# Patient Record
Sex: FEMALE | Race: BLACK OR AFRICAN AMERICAN | NOT HISPANIC OR LATINO | Employment: PART TIME | URBAN - METROPOLITAN AREA
[De-identification: names, ages, dates, MRNs, and addresses within clinical notes are randomized per-mention and may not be internally consistent; named-entity substitution may affect disease eponyms.]

---

## 2017-09-28 ENCOUNTER — HOSPITAL ENCOUNTER (EMERGENCY)
Facility: OTHER | Age: 42
Discharge: HOME | End: 2017-09-28
Attending: INTERNAL MEDICINE
Payer: COMMERCIAL

## 2017-09-28 VITALS
TEMPERATURE: 98 F | OXYGEN SATURATION: 100 % | WEIGHT: 237 LBS | RESPIRATION RATE: 16 BRPM | HEART RATE: 57 BPM | BODY MASS INDEX: 38.09 KG/M2 | SYSTOLIC BLOOD PRESSURE: 121 MMHG | HEIGHT: 66 IN | DIASTOLIC BLOOD PRESSURE: 85 MMHG

## 2017-09-28 DIAGNOSIS — M25.561 ACUTE PAIN OF RIGHT KNEE: Primary | ICD-10-CM

## 2017-09-28 PROCEDURE — 99283 EMERGENCY DEPT VISIT LOW MDM: CPT

## 2017-09-28 RX ORDER — IBUPROFEN 800 MG/1
800 TABLET ORAL EVERY 8 HOURS PRN
Qty: 30 TABLET | Refills: 0 | Status: SHIPPED | OUTPATIENT
Start: 2017-09-28 | End: 2022-02-25

## 2017-09-29 NOTE — ED PROVIDER NOTES
Encounter Date: 2017       History     Chief Complaint   Patient presents with    Knee Pain     Patient states her right knee hurts for a few days.  Denies any trauma or swelling.     42-year-old female presents to the emergency department complaining of right knee pain ×3 days.  She denies any injury to the right knee.  Denies fevers/chills      The history is provided by the patient. No  was used.   Leg Pain    There was no injury mechanism. The pain is present in the right knee. The quality of the pain is described as aching. The pain is at a severity of 4/10. The pain has been fluctuating since onset. Pertinent negatives include no numbness, no inability to bear weight, no loss of motion, no muscle weakness and no loss of sensation. She reports no foreign bodies present. The symptoms are aggravated by activity and bearing weight. She has tried nothing for the symptoms.     Review of patient's allergies indicates:  No Known Allergies  Past Medical History:   Diagnosis Date    Ovarian cyst      Past Surgical History:   Procedure Laterality Date    CARDIAC SURGERY       SECTION, CLASSIC       SECTION, LOW TRANSVERSE      HYSTERECTOMY      OOPHORECTOMY      PARTIAL HYSTERECTOMY       Family History   Problem Relation Age of Onset    Diabetes Mother     Hypertension Mother      Social History   Substance Use Topics    Smoking status: Former Smoker    Smokeless tobacco: Never Used    Alcohol use Yes      Comment: Socially     Review of Systems   Musculoskeletal: Positive for arthralgias.   Neurological: Negative for numbness.   All other systems reviewed and are negative.      Physical Exam     Initial Vitals [177]   BP Pulse Resp Temp SpO2   (!) 148/94 62 20 98 °F (36.7 °C) 100 %      MAP       112         Physical Exam    Constitutional: She appears well-developed and well-nourished.   Obese   HENT:   Head: Normocephalic and atraumatic.   Eyes: EOM are  normal.   Neck: Normal range of motion.   Cardiovascular: Intact distal pulses.   Pulmonary/Chest: No respiratory distress.   Abdominal: Soft.   Musculoskeletal:   Right knee pain upon movement without edema.  No gross deformity.  Neurovascularly intact   Neurological: She is alert. She has normal strength.   Skin: Skin is warm and dry.   Psychiatric: She has a normal mood and affect.         ED Course   Procedures  Labs Reviewed - No data to display          Medical Decision Making:   Initial Assessment:   42-year-old female presents to the emergency department complaining of right knee pain ×3 days.  She denies any injury to the right knee.  Denies fevers/chills  Differential Diagnosis:   Right knee arthritis  Right knee fracture  Right knee sprain  ED Management:  Patient was given instructions for right knee pain and a prescription for ibuprofen.  She was advised to follow-up with her primary care physician within the next 3 days for reevaluation.                   ED Course      Clinical Impression:   The encounter diagnosis was Acute pain of right knee.    Disposition:   Disposition: Discharged  Condition: Stable                        Dawson Angulo MD  09/28/17 [id number]

## 2020-01-30 DIAGNOSIS — Z12.31 ENCOUNTER FOR SCREENING MAMMOGRAM FOR BREAST CANCER: Primary | ICD-10-CM

## 2020-02-06 ENCOUNTER — HOSPITAL ENCOUNTER (OUTPATIENT)
Dept: RADIOLOGY | Facility: HOSPITAL | Age: 45
Discharge: HOME | End: 2020-02-06
Attending: SPECIALIST
Payer: COMMERCIAL

## 2020-02-06 DIAGNOSIS — Z12.31 ENCOUNTER FOR SCREENING MAMMOGRAM FOR BREAST CANCER: ICD-10-CM

## 2020-02-06 PROCEDURE — 77067 SCR MAMMO BI INCL CAD: CPT | Mod: 26,,, | Performed by: RADIOLOGY

## 2020-02-06 PROCEDURE — 77067 SCR MAMMO BI INCL CAD: CPT | Mod: TC

## 2020-02-06 PROCEDURE — 77063 BREAST TOMOSYNTHESIS BI: CPT | Mod: 26,,, | Performed by: RADIOLOGY

## 2020-02-06 PROCEDURE — 77063 MAMMO DIGITAL SCREENING BILAT WITH TOMOSYNTHESIS_CAD: ICD-10-PCS | Mod: 26,,, | Performed by: RADIOLOGY

## 2020-02-06 PROCEDURE — 77067 MAMMO DIGITAL SCREENING BILAT WITH TOMOSYNTHESIS_CAD: ICD-10-PCS | Mod: 26,,, | Performed by: RADIOLOGY

## 2021-04-29 DIAGNOSIS — Z12.31 SCREENING MAMMOGRAM FOR HIGH-RISK PATIENT: Primary | ICD-10-CM

## 2021-05-04 ENCOUNTER — HOSPITAL ENCOUNTER (OUTPATIENT)
Dept: RADIOLOGY | Facility: HOSPITAL | Age: 46
Discharge: HOME | End: 2021-05-04
Attending: SPECIALIST
Payer: COMMERCIAL

## 2021-05-04 DIAGNOSIS — Z12.31 SCREENING MAMMOGRAM FOR HIGH-RISK PATIENT: ICD-10-CM

## 2021-05-04 PROCEDURE — 77063 MAMMO DIGITAL SCREENING BILAT WITH TOMO: ICD-10-PCS | Mod: 26,,, | Performed by: RADIOLOGY

## 2021-05-04 PROCEDURE — 77067 SCR MAMMO BI INCL CAD: CPT | Mod: 26,,, | Performed by: RADIOLOGY

## 2021-05-04 PROCEDURE — 77067 MAMMO DIGITAL SCREENING BILAT WITH TOMO: ICD-10-PCS | Mod: 26,,, | Performed by: RADIOLOGY

## 2021-05-04 PROCEDURE — 77063 BREAST TOMOSYNTHESIS BI: CPT | Mod: 26,,, | Performed by: RADIOLOGY

## 2021-05-04 PROCEDURE — 77067 SCR MAMMO BI INCL CAD: CPT | Mod: TC

## 2022-02-25 ENCOUNTER — OFFICE VISIT (OUTPATIENT)
Dept: FAMILY MEDICINE | Facility: CLINIC | Age: 47
End: 2022-02-25
Payer: COMMERCIAL

## 2022-02-25 ENCOUNTER — LAB VISIT (OUTPATIENT)
Dept: LAB | Facility: HOSPITAL | Age: 47
End: 2022-02-25
Attending: FAMILY MEDICINE
Payer: COMMERCIAL

## 2022-02-25 VITALS
OXYGEN SATURATION: 98 % | WEIGHT: 266.75 LBS | DIASTOLIC BLOOD PRESSURE: 80 MMHG | HEART RATE: 65 BPM | HEIGHT: 66 IN | SYSTOLIC BLOOD PRESSURE: 120 MMHG | BODY MASS INDEX: 42.87 KG/M2

## 2022-02-25 DIAGNOSIS — E66.01 CLASS 3 SEVERE OBESITY DUE TO EXCESS CALORIES WITH SERIOUS COMORBIDITY AND BODY MASS INDEX (BMI) OF 40.0 TO 44.9 IN ADULT: ICD-10-CM

## 2022-02-25 DIAGNOSIS — Z28.21 COVID-19 VACCINATION DECLINED: ICD-10-CM

## 2022-02-25 DIAGNOSIS — Z80.3 FAMILY HISTORY OF BREAST CANCER: ICD-10-CM

## 2022-02-25 DIAGNOSIS — Z83.3 FAMILY HISTORY OF DIABETES MELLITUS: ICD-10-CM

## 2022-02-25 DIAGNOSIS — Z11.3 ROUTINE SCREENING FOR STI (SEXUALLY TRANSMITTED INFECTION): ICD-10-CM

## 2022-02-25 DIAGNOSIS — Z00.01 ENCOUNTER FOR GENERAL ADULT MEDICAL EXAMINATION WITH ABNORMAL FINDINGS: ICD-10-CM

## 2022-02-25 DIAGNOSIS — R92.8 ABNORMAL MAMMOGRAM OF LEFT BREAST: ICD-10-CM

## 2022-02-25 DIAGNOSIS — Z12.31 ENCOUNTER FOR SCREENING MAMMOGRAM FOR MALIGNANT NEOPLASM OF BREAST: ICD-10-CM

## 2022-02-25 DIAGNOSIS — Z00.01 ENCOUNTER FOR GENERAL ADULT MEDICAL EXAMINATION WITH ABNORMAL FINDINGS: Primary | ICD-10-CM

## 2022-02-25 DIAGNOSIS — Z28.21 INFLUENZA VACCINATION DECLINED: ICD-10-CM

## 2022-02-25 PROBLEM — M25.561 ACUTE PAIN OF RIGHT KNEE: Status: RESOLVED | Noted: 2017-09-28 | Resolved: 2022-02-25

## 2022-02-25 PROBLEM — E66.813 CLASS 3 SEVERE OBESITY DUE TO EXCESS CALORIES WITH SERIOUS COMORBIDITY AND BODY MASS INDEX (BMI) OF 40.0 TO 44.9 IN ADULT: Status: ACTIVE | Noted: 2022-02-25

## 2022-02-25 LAB
ALBUMIN SERPL BCP-MCNC: 3.8 G/DL (ref 3.5–5.2)
ALP SERPL-CCNC: 94 U/L (ref 55–135)
ALT SERPL W/O P-5'-P-CCNC: 29 U/L (ref 10–44)
ANION GAP SERPL CALC-SCNC: 9 MMOL/L (ref 8–16)
AST SERPL-CCNC: 28 U/L (ref 10–40)
BASOPHILS # BLD AUTO: 0.05 K/UL (ref 0–0.2)
BASOPHILS NFR BLD: 1.2 % (ref 0–1.9)
BILIRUB SERPL-MCNC: 0.7 MG/DL (ref 0.1–1)
BUN SERPL-MCNC: 13 MG/DL (ref 6–20)
CALCIUM SERPL-MCNC: 9.6 MG/DL (ref 8.7–10.5)
CHLORIDE SERPL-SCNC: 103 MMOL/L (ref 95–110)
CHOLEST SERPL-MCNC: 199 MG/DL (ref 120–199)
CHOLEST/HDLC SERPL: 4.7 {RATIO} (ref 2–5)
CO2 SERPL-SCNC: 27 MMOL/L (ref 23–29)
CREAT SERPL-MCNC: 0.7 MG/DL (ref 0.5–1.4)
DIFFERENTIAL METHOD: ABNORMAL
EOSINOPHIL # BLD AUTO: 0.1 K/UL (ref 0–0.5)
EOSINOPHIL NFR BLD: 3.1 % (ref 0–8)
ERYTHROCYTE [DISTWIDTH] IN BLOOD BY AUTOMATED COUNT: 14 % (ref 11.5–14.5)
EST. GFR  (AFRICAN AMERICAN): >60 ML/MIN/1.73 M^2
EST. GFR  (NON AFRICAN AMERICAN): >60 ML/MIN/1.73 M^2
ESTIMATED AVG GLUCOSE: 117 MG/DL (ref 68–131)
GLUCOSE SERPL-MCNC: 86 MG/DL (ref 70–110)
HBA1C MFR BLD: 5.7 % (ref 4–5.6)
HCT VFR BLD AUTO: 48.9 % (ref 37–48.5)
HDLC SERPL-MCNC: 42 MG/DL (ref 40–75)
HDLC SERPL: 21.1 % (ref 20–50)
HGB BLD-MCNC: 14.7 G/DL (ref 12–16)
IMM GRANULOCYTES # BLD AUTO: 0.01 K/UL (ref 0–0.04)
IMM GRANULOCYTES NFR BLD AUTO: 0.2 % (ref 0–0.5)
LDLC SERPL CALC-MCNC: 138.6 MG/DL (ref 63–159)
LYMPHOCYTES # BLD AUTO: 2.1 K/UL (ref 1–4.8)
LYMPHOCYTES NFR BLD: 48.9 % (ref 18–48)
MCH RBC QN AUTO: 27.1 PG (ref 27–31)
MCHC RBC AUTO-ENTMCNC: 30.1 G/DL (ref 32–36)
MCV RBC AUTO: 90 FL (ref 82–98)
MONOCYTES # BLD AUTO: 0.5 K/UL (ref 0.3–1)
MONOCYTES NFR BLD: 10.6 % (ref 4–15)
NEUTROPHILS # BLD AUTO: 1.5 K/UL (ref 1.8–7.7)
NEUTROPHILS NFR BLD: 36 % (ref 38–73)
NONHDLC SERPL-MCNC: 157 MG/DL
NRBC BLD-RTO: 0 /100 WBC
PLATELET # BLD AUTO: 298 K/UL (ref 150–450)
PMV BLD AUTO: 12.2 FL (ref 9.2–12.9)
POTASSIUM SERPL-SCNC: 4.1 MMOL/L (ref 3.5–5.1)
PROT SERPL-MCNC: 8.3 G/DL (ref 6–8.4)
RBC # BLD AUTO: 5.42 M/UL (ref 4–5.4)
SODIUM SERPL-SCNC: 139 MMOL/L (ref 136–145)
TRIGL SERPL-MCNC: 92 MG/DL (ref 30–150)
TSH SERPL DL<=0.005 MIU/L-ACNC: 1.99 UIU/ML (ref 0.4–4)
WBC # BLD AUTO: 4.23 K/UL (ref 3.9–12.7)

## 2022-02-25 PROCEDURE — 83036 HEMOGLOBIN GLYCOSYLATED A1C: CPT | Performed by: FAMILY MEDICINE

## 2022-02-25 PROCEDURE — 87389 HIV-1 AG W/HIV-1&-2 AB AG IA: CPT | Performed by: FAMILY MEDICINE

## 2022-02-25 PROCEDURE — 85025 COMPLETE CBC W/AUTO DIFF WBC: CPT | Performed by: FAMILY MEDICINE

## 2022-02-25 PROCEDURE — 84443 ASSAY THYROID STIM HORMONE: CPT | Performed by: FAMILY MEDICINE

## 2022-02-25 PROCEDURE — 86704 HEP B CORE ANTIBODY TOTAL: CPT | Performed by: FAMILY MEDICINE

## 2022-02-25 PROCEDURE — 86706 HEP B SURFACE ANTIBODY: CPT | Performed by: FAMILY MEDICINE

## 2022-02-25 PROCEDURE — 99999 PR PBB SHADOW E&M-EST. PATIENT-LVL III: ICD-10-PCS | Mod: PBBFAC,,, | Performed by: FAMILY MEDICINE

## 2022-02-25 PROCEDURE — 99386 PR PREVENTIVE VISIT,NEW,40-64: ICD-10-PCS | Mod: S$GLB,,, | Performed by: FAMILY MEDICINE

## 2022-02-25 PROCEDURE — 99999 PR PBB SHADOW E&M-EST. PATIENT-LVL III: CPT | Mod: PBBFAC,,, | Performed by: FAMILY MEDICINE

## 2022-02-25 PROCEDURE — 86803 HEPATITIS C AB TEST: CPT | Performed by: FAMILY MEDICINE

## 2022-02-25 PROCEDURE — 99386 PREV VISIT NEW AGE 40-64: CPT | Mod: S$GLB,,, | Performed by: FAMILY MEDICINE

## 2022-02-25 PROCEDURE — 87340 HEPATITIS B SURFACE AG IA: CPT | Performed by: FAMILY MEDICINE

## 2022-02-25 PROCEDURE — 99213 OFFICE O/P EST LOW 20 MIN: CPT | Mod: PBBFAC,PO | Performed by: FAMILY MEDICINE

## 2022-02-25 PROCEDURE — 36415 COLL VENOUS BLD VENIPUNCTURE: CPT | Mod: PO | Performed by: FAMILY MEDICINE

## 2022-02-25 PROCEDURE — 86592 SYPHILIS TEST NON-TREP QUAL: CPT | Performed by: FAMILY MEDICINE

## 2022-02-25 PROCEDURE — 80053 COMPREHEN METABOLIC PANEL: CPT | Performed by: FAMILY MEDICINE

## 2022-02-25 PROCEDURE — 80061 LIPID PANEL: CPT | Performed by: FAMILY MEDICINE
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
## 2022-02-26 LAB — RPR SER QL: NORMAL

## 2022-02-28 LAB
HBV CORE AB SERPL QL IA: NEGATIVE
HBV SURFACE AB SER-ACNC: NEGATIVE M[IU]/ML
HBV SURFACE AG SERPL QL IA: NEGATIVE
HCV AB SERPL QL IA: NEGATIVE
HIV 1+2 AB+HIV1 P24 AG SERPL QL IA: NEGATIVE

## 2022-03-11 ENCOUNTER — OFFICE VISIT (OUTPATIENT)
Dept: FAMILY MEDICINE | Facility: CLINIC | Age: 47
End: 2022-03-11
Payer: COMMERCIAL

## 2022-03-11 VITALS
SYSTOLIC BLOOD PRESSURE: 120 MMHG | DIASTOLIC BLOOD PRESSURE: 80 MMHG | HEART RATE: 68 BPM | WEIGHT: 266.75 LBS | OXYGEN SATURATION: 97 % | BODY MASS INDEX: 42.87 KG/M2 | HEIGHT: 66 IN

## 2022-03-11 DIAGNOSIS — R73.03 PREDIABETES: Primary | ICD-10-CM

## 2022-03-11 DIAGNOSIS — Z28.21 INFLUENZA VACCINATION DECLINED: ICD-10-CM

## 2022-03-11 DIAGNOSIS — E66.01 CLASS 3 SEVERE OBESITY DUE TO EXCESS CALORIES WITH SERIOUS COMORBIDITY AND BODY MASS INDEX (BMI) OF 40.0 TO 44.9 IN ADULT: ICD-10-CM

## 2022-03-11 DIAGNOSIS — Z28.21 COVID-19 VACCINATION DECLINED: ICD-10-CM

## 2022-03-11 DIAGNOSIS — Z11.3 ROUTINE SCREENING FOR STI (SEXUALLY TRANSMITTED INFECTION): ICD-10-CM

## 2022-03-11 DIAGNOSIS — R63.8 DIFFICULTY MAINTAINING WEIGHT: ICD-10-CM

## 2022-03-11 DIAGNOSIS — Z83.3 FAMILY HISTORY OF DIABETES MELLITUS: ICD-10-CM

## 2022-03-11 PROCEDURE — 99213 OFFICE O/P EST LOW 20 MIN: CPT | Mod: S$GLB,,, | Performed by: FAMILY MEDICINE

## 2022-03-11 PROCEDURE — 99999 PR PBB SHADOW E&M-EST. PATIENT-LVL III: ICD-10-PCS | Mod: PBBFAC,,, | Performed by: FAMILY MEDICINE

## 2022-03-11 PROCEDURE — 99213 OFFICE O/P EST LOW 20 MIN: CPT | Mod: PBBFAC,PO | Performed by: FAMILY MEDICINE

## 2022-03-11 PROCEDURE — 99213 PR OFFICE/OUTPT VISIT, EST, LEVL III, 20-29 MIN: ICD-10-PCS | Mod: S$GLB,,, | Performed by: FAMILY MEDICINE

## 2022-03-11 PROCEDURE — 99999 PR PBB SHADOW E&M-EST. PATIENT-LVL III: CPT | Mod: PBBFAC,,, | Performed by: FAMILY MEDICINE

## 2022-03-11 NOTE — PROGRESS NOTES
Subjective:       Patient ID: So Winkler is a 46 y.o. female.    Chief Complaint: Follow-up    Patient Active Problem List   Diagnosis    Class 3 severe obesity due to excess calories with serious comorbidity and body mass index (BMI) of 40.0 to 44.9 in adult    Prediabetes    Family history of diabetes mellitus      HPI  45 yo female presents today to review lab results. Has breast imaging to follow up abn MMG scheduled.     Review of Systems   All other systems reviewed and are negative.      Results for orders placed or performed in visit on 02/25/22   CBC Auto Differential   Result Value Ref Range    WBC 4.23 3.90 - 12.70 K/uL    RBC 5.42 (H) 4.00 - 5.40 M/uL    Hemoglobin 14.7 12.0 - 16.0 g/dL    Hematocrit 48.9 (H) 37.0 - 48.5 %    MCV 90 82 - 98 fL    MCH 27.1 27.0 - 31.0 pg    MCHC 30.1 (L) 32.0 - 36.0 g/dL    RDW 14.0 11.5 - 14.5 %    Platelets 298 150 - 450 K/uL    MPV 12.2 9.2 - 12.9 fL    Immature Granulocytes 0.2 0.0 - 0.5 %    Gran # (ANC) 1.5 (L) 1.8 - 7.7 K/uL    Immature Grans (Abs) 0.01 0.00 - 0.04 K/uL    Lymph # 2.1 1.0 - 4.8 K/uL    Mono # 0.5 0.3 - 1.0 K/uL    Eos # 0.1 0.0 - 0.5 K/uL    Baso # 0.05 0.00 - 0.20 K/uL    nRBC 0 0 /100 WBC    Gran % 36.0 (L) 38.0 - 73.0 %    Lymph % 48.9 (H) 18.0 - 48.0 %    Mono % 10.6 4.0 - 15.0 %    Eosinophil % 3.1 0.0 - 8.0 %    Basophil % 1.2 0.0 - 1.9 %    Differential Method Automated    Comprehensive Metabolic Panel   Result Value Ref Range    Sodium 139 136 - 145 mmol/L    Potassium 4.1 3.5 - 5.1 mmol/L    Chloride 103 95 - 110 mmol/L    CO2 27 23 - 29 mmol/L    Glucose 86 70 - 110 mg/dL    BUN 13 6 - 20 mg/dL    Creatinine 0.7 0.5 - 1.4 mg/dL    Calcium 9.6 8.7 - 10.5 mg/dL    Total Protein 8.3 6.0 - 8.4 g/dL    Albumin 3.8 3.5 - 5.2 g/dL    Total Bilirubin 0.7 0.1 - 1.0 mg/dL    Alkaline Phosphatase 94 55 - 135 U/L    AST 28 10 - 40 U/L    ALT 29 10 - 44 U/L    Anion Gap 9 8 - 16 mmol/L    eGFR if African American >60.0 >60 mL/min/1.73 m^2     eGFR if non African American >60.0 >60 mL/min/1.73 m^2   Hemoglobin A1C   Result Value Ref Range    Hemoglobin A1C 5.7 (H) 4.0 - 5.6 %    Estimated Avg Glucose 117 68 - 131 mg/dL   Lipid Panel   Result Value Ref Range    Cholesterol 199 120 - 199 mg/dL    Triglycerides 92 30 - 150 mg/dL    HDL 42 40 - 75 mg/dL    LDL Cholesterol 138.6 63.0 - 159.0 mg/dL    HDL/Cholesterol Ratio 21.1 20.0 - 50.0 %    Total Cholesterol/HDL Ratio 4.7 2.0 - 5.0    Non-HDL Cholesterol 157 mg/dL   TSH   Result Value Ref Range    TSH 1.992 0.400 - 4.000 uIU/mL   HIV 1/2 Ag/Ab (4th Gen)   Result Value Ref Range    HIV 1/2 Ag/Ab Negative Negative   RPR   Result Value Ref Range    RPR Non-reactive Non-reactive   Hepatitis C Antibody   Result Value Ref Range    Hepatitis C Ab Negative Negative   Hepatitis B Surface Ab, Qualitative   Result Value Ref Range    Hep B S Ab Negative    Hepatitis B Surface Antigen   Result Value Ref Range    Hepatitis B Surface Ag Negative Negative   Hepatitis B Core Antibody, Total   Result Value Ref Range    Hep B Core Total Ab Negative        Objective:     Vitals:    03/11/22 0955   BP: 120/80   Pulse: 68        Physical Exam  Vitals and nursing note reviewed.   Constitutional:       General: She is not in acute distress.     Appearance: Normal appearance. She is obese. She is not ill-appearing, toxic-appearing or diaphoretic.   HENT:      Head: Normocephalic and atraumatic.   Eyes:      General: No scleral icterus.     Conjunctiva/sclera: Conjunctivae normal.   Cardiovascular:      Rate and Rhythm: Normal rate.      Heart sounds: Normal heart sounds.   Pulmonary:      Effort: Pulmonary effort is normal. No respiratory distress.      Breath sounds: Normal breath sounds.   Skin:     Coloration: Skin is not pale.   Neurological:      Mental Status: She is alert. Mental status is at baseline.   Psychiatric:         Attention and Perception: Attention and perception normal.         Mood and Affect: Mood and  affect normal.         Speech: Speech normal.         Behavior: Behavior normal.         Cognition and Memory: Cognition and memory normal.         Judgment: Judgment normal.         Assessment:       1. Prediabetes    2. Class 3 severe obesity due to excess calories with serious comorbidity and body mass index (BMI) of 40.0 to 44.9 in adult    3. Difficulty maintaining weight    4. Routine screening for STI (sexually transmitted infection)    5. Family history of diabetes mellitus    6. COVID-19 vaccination declined    7. Influenza vaccination declined        Plan:         Available lab results reviewed.     Prediabetes  Class 3 severe obesity due to excess calories with serious comorbidity and body mass index (BMI) of 40.0 to 44.9 in adult  Difficulty maintaining weight  -     semaglutide (OZEMPIC) 0.25 mg or 0.5 mg(2 mg/1.5 mL) pen injector; Inject 0.25mg weekly x 4 weeks then 0.5mg weekly thereafter  Dispense: 2 pen; Refill: 1  - Lifestyle modifications reviewed.     Routine screening for STI (sexually transmitted infection)  - Normal labs reviewed, continued safe sex practices encouraged    Family history of diabetes mellitus    COVID-19 vaccination declined  Influenza vaccination declined    Patient's questions answered. Plan reviewed with patient at the end of visit. Relevant precautions to chief complaint and reasons to seek medical care or contact the office sooner reviewed with patient.     Follow up in about 8 weeks (around 5/6/2022) for f/u s/p new medication/ titration.

## 2022-03-29 ENCOUNTER — HOSPITAL ENCOUNTER (OUTPATIENT)
Dept: RADIOLOGY | Facility: HOSPITAL | Age: 47
Discharge: HOME | End: 2022-03-29
Attending: FAMILY MEDICINE
Payer: COMMERCIAL

## 2022-03-29 ENCOUNTER — HOSPITAL ENCOUNTER (OUTPATIENT)
Dept: RADIOLOGY | Facility: HOSPITAL | Age: 47
Discharge: HOME | End: 2022-03-29
Attending: SPECIALIST
Payer: COMMERCIAL

## 2022-03-29 DIAGNOSIS — R92.8 ABNORMAL MAMMOGRAM: ICD-10-CM

## 2022-03-29 DIAGNOSIS — R92.8 ABNORMAL MAMMOGRAM OF LEFT BREAST: ICD-10-CM

## 2022-03-29 PROCEDURE — 76642 US BREAST LEFT LIMITED: ICD-10-PCS | Mod: 26,LT,, | Performed by: RADIOLOGY

## 2022-03-29 PROCEDURE — 76642 ULTRASOUND BREAST LIMITED: CPT | Mod: TC,LT

## 2022-03-29 PROCEDURE — 77061 BREAST TOMOSYNTHESIS UNI: CPT | Mod: 26,LT,, | Performed by: RADIOLOGY

## 2022-03-29 PROCEDURE — 77065 MAMMO DIGITAL DIAGNOSTIC LEFT WITH TOMO: ICD-10-PCS | Mod: 26,LT,, | Performed by: RADIOLOGY

## 2022-03-29 PROCEDURE — 76642 ULTRASOUND BREAST LIMITED: CPT | Mod: 26,LT,, | Performed by: RADIOLOGY

## 2022-03-29 PROCEDURE — 77065 DX MAMMO INCL CAD UNI: CPT | Mod: 26,LT,, | Performed by: RADIOLOGY

## 2022-03-29 PROCEDURE — 77061 MAMMO DIGITAL DIAGNOSTIC LEFT WITH TOMO: ICD-10-PCS | Mod: 26,LT,, | Performed by: RADIOLOGY

## 2022-03-29 PROCEDURE — 77065 DX MAMMO INCL CAD UNI: CPT | Mod: TC,LT

## 2022-03-30 ENCOUNTER — TELEPHONE (OUTPATIENT)
Dept: RADIOLOGY | Facility: HOSPITAL | Age: 47
End: 2022-03-30
Payer: COMMERCIAL

## 2022-03-30 NOTE — TELEPHONE ENCOUNTER
Spoke with patient. Reviewed breast biopsy procedure and reviewed instructions for breast biopsy. Patient expressed understanding and all questions were answered. Provided patient with my phone number to call for any further concerns or questions.   Patient scheduled breast biopsy at the [facility] on 4/22/2022.

## 2022-04-08 ENCOUNTER — TELEPHONE (OUTPATIENT)
Dept: RADIOLOGY | Facility: HOSPITAL | Age: 47
End: 2022-04-08
Payer: COMMERCIAL

## 2022-04-28 ENCOUNTER — TELEPHONE (OUTPATIENT)
Dept: RADIOLOGY | Facility: HOSPITAL | Age: 47
End: 2022-04-28
Payer: COMMERCIAL

## 2022-04-28 NOTE — TELEPHONE ENCOUNTER
Called patient to remind about appointment and ask her to arrive 30 minutes early. Patient has been approved for use of the [facility].

## 2022-04-29 ENCOUNTER — HOSPITAL ENCOUNTER (OUTPATIENT)
Dept: RADIOLOGY | Facility: HOSPITAL | Age: 47
Discharge: HOME | End: 2022-04-29
Attending: FAMILY MEDICINE
Payer: COMMERCIAL

## 2022-04-29 DIAGNOSIS — R92.8 ABNORMAL MAMMOGRAM: ICD-10-CM

## 2022-04-29 PROCEDURE — 77061 BREAST TOMOSYNTHESIS UNI: CPT | Mod: 26,LT,, | Performed by: RADIOLOGY

## 2022-04-29 PROCEDURE — 88305 TISSUE EXAM BY PATHOLOGIST: CPT | Performed by: PATHOLOGY

## 2022-04-29 PROCEDURE — 77065 DX MAMMO INCL CAD UNI: CPT | Mod: TC,LT

## 2022-04-29 PROCEDURE — 88305 TISSUE EXAM BY PATHOLOGIST: CPT | Mod: 26,,, | Performed by: PATHOLOGY

## 2022-04-29 PROCEDURE — 77065 MAMMO DIGITAL DIAGNOSTIC LEFT WITH TOMO: ICD-10-PCS | Mod: 26,LT,, | Performed by: RADIOLOGY

## 2022-04-29 PROCEDURE — 88342 CHG IMMUNOCYTOCHEMISTRY: ICD-10-PCS | Mod: 26,,, | Performed by: PATHOLOGY

## 2022-04-29 PROCEDURE — 77065 DX MAMMO INCL CAD UNI: CPT | Mod: 26,LT,, | Performed by: RADIOLOGY

## 2022-04-29 PROCEDURE — 88305 TISSUE EXAM BY PATHOLOGIST: ICD-10-PCS | Mod: 26,,, | Performed by: PATHOLOGY

## 2022-04-29 PROCEDURE — 88341 IMHCHEM/IMCYTCHM EA ADD ANTB: CPT | Mod: 26,,, | Performed by: PATHOLOGY

## 2022-04-29 PROCEDURE — 88342 IMHCHEM/IMCYTCHM 1ST ANTB: CPT | Mod: 26,,, | Performed by: PATHOLOGY

## 2022-04-29 PROCEDURE — 88342 IMHCHEM/IMCYTCHM 1ST ANTB: CPT | Performed by: PATHOLOGY

## 2022-04-29 PROCEDURE — 77061 MAMMO DIGITAL DIAGNOSTIC LEFT WITH TOMO: ICD-10-PCS | Mod: 26,LT,, | Performed by: RADIOLOGY

## 2022-04-29 PROCEDURE — 27200939 MAMMO BREAST STEREOTACTIC BREAST BIOPSY LEFT

## 2022-04-29 PROCEDURE — 25000003 PHARM REV CODE 250: Performed by: FAMILY MEDICINE

## 2022-04-29 PROCEDURE — 88341 PR IHC OR ICC EACH ADD'L SINGLE ANTIBODY  STAINPR: ICD-10-PCS | Mod: 26,,, | Performed by: PATHOLOGY

## 2022-04-29 PROCEDURE — 19081 MAMMO BREAST STEREOTACTIC BREAST BIOPSY LEFT: ICD-10-PCS | Mod: LT,,, | Performed by: RADIOLOGY

## 2022-04-29 PROCEDURE — 88341 IMHCHEM/IMCYTCHM EA ADD ANTB: CPT | Performed by: PATHOLOGY

## 2022-04-29 PROCEDURE — 19081 BX BREAST 1ST LESION STRTCTC: CPT | Mod: LT,,, | Performed by: RADIOLOGY

## 2022-04-29 RX ORDER — LIDOCAINE HCL/EPINEPHRINE/PF 2%-1:200K
20 VIAL (ML) INJECTION ONCE
Status: COMPLETED | OUTPATIENT
Start: 2022-04-29 | End: 2022-04-29

## 2022-04-29 RX ORDER — SODIUM BICARBONATE 42 MG/ML
3 INJECTION, SOLUTION INTRAVENOUS ONCE
Status: COMPLETED | OUTPATIENT
Start: 2022-04-29 | End: 2022-04-29

## 2022-04-29 RX ORDER — LIDOCAINE HYDROCHLORIDE 10 MG/ML
3 INJECTION INFILTRATION; PERINEURAL ONCE
Status: COMPLETED | OUTPATIENT
Start: 2022-04-29 | End: 2022-04-29

## 2022-04-29 RX ADMIN — LIDOCAINE HYDROCHLORIDE 3 ML: 10 INJECTION, SOLUTION EPIDURAL; INFILTRATION; INTRACAUDAL; PERINEURAL at 11:04

## 2022-04-29 RX ADMIN — LIDOCAINE HYDROCHLORIDE,EPINEPHRINE BITARTRATE 20 ML: 20; .005 INJECTION, SOLUTION EPIDURAL; INFILTRATION; INTRACAUDAL; PERINEURAL at 11:04

## 2022-04-29 RX ADMIN — SODIUM BICARBONATE 3 ML: 42 INJECTION, SOLUTION INTRAVENOUS at 11:04

## 2022-05-05 LAB
FINAL PATHOLOGIC DIAGNOSIS: NORMAL
GROSS: NORMAL
Lab: NORMAL

## 2022-05-06 ENCOUNTER — PATIENT MESSAGE (OUTPATIENT)
Dept: SURGERY | Facility: CLINIC | Age: 47
End: 2022-05-06
Payer: COMMERCIAL

## 2022-05-06 ENCOUNTER — TELEPHONE (OUTPATIENT)
Dept: SURGERY | Facility: CLINIC | Age: 47
End: 2022-05-06
Payer: COMMERCIAL

## 2022-05-06 ENCOUNTER — OFFICE VISIT (OUTPATIENT)
Dept: FAMILY MEDICINE | Facility: CLINIC | Age: 47
End: 2022-05-06
Payer: COMMERCIAL

## 2022-05-06 DIAGNOSIS — Z98.890 S/P BREAST BIOPSY: ICD-10-CM

## 2022-05-06 DIAGNOSIS — R73.03 PREDIABETES: Primary | ICD-10-CM

## 2022-05-06 DIAGNOSIS — E66.01 CLASS 3 SEVERE OBESITY DUE TO EXCESS CALORIES WITH SERIOUS COMORBIDITY AND BODY MASS INDEX (BMI) OF 40.0 TO 44.9 IN ADULT: ICD-10-CM

## 2022-05-06 DIAGNOSIS — R63.8 DIFFICULTY MAINTAINING WEIGHT: ICD-10-CM

## 2022-05-06 PROCEDURE — 1159F MED LIST DOCD IN RCRD: CPT | Mod: CPTII,95,, | Performed by: FAMILY MEDICINE

## 2022-05-06 PROCEDURE — 99213 PR OFFICE/OUTPT VISIT, EST, LEVL III, 20-29 MIN: ICD-10-PCS | Mod: 95,,, | Performed by: FAMILY MEDICINE

## 2022-05-06 PROCEDURE — 3044F HG A1C LEVEL LT 7.0%: CPT | Mod: CPTII,95,, | Performed by: FAMILY MEDICINE

## 2022-05-06 PROCEDURE — 99213 OFFICE O/P EST LOW 20 MIN: CPT | Mod: 95,,, | Performed by: FAMILY MEDICINE

## 2022-05-06 PROCEDURE — 3044F PR MOST RECENT HEMOGLOBIN A1C LEVEL <7.0%: ICD-10-PCS | Mod: CPTII,95,, | Performed by: FAMILY MEDICINE

## 2022-05-06 PROCEDURE — 1160F PR REVIEW ALL MEDS BY PRESCRIBER/CLIN PHARMACIST DOCUMENTED: ICD-10-PCS | Mod: CPTII,95,, | Performed by: FAMILY MEDICINE

## 2022-05-06 PROCEDURE — 1159F PR MEDICATION LIST DOCUMENTED IN MEDICAL RECORD: ICD-10-PCS | Mod: CPTII,95,, | Performed by: FAMILY MEDICINE

## 2022-05-06 PROCEDURE — 1160F RVW MEDS BY RX/DR IN RCRD: CPT | Mod: CPTII,95,, | Performed by: FAMILY MEDICINE

## 2022-05-06 NOTE — TELEPHONE ENCOUNTER
Returned call as requested by patient.  No answer could not leave a message as voicemail box not setup.  Will try patient again.

## 2022-05-06 NOTE — PROGRESS NOTES
Subjective:       Patient ID: So Winkler is a 46 y.o. female.    Chief Complaint: No chief complaint on file.    Patient Active Problem List   Diagnosis    Class 3 severe obesity due to excess calories with serious comorbidity and body mass index (BMI) of 40.0 to 44.9 in adult    Prediabetes    Family history of diabetes mellitus      HPI  45 yo female presents today for f/u after prescription of Ozempic for weight loss. Reports was not able to . Asked to send to [facility]. Has not been over indulgent with food, but has not started on increased physical activity. Recently became a homeowner and busy with that. S/p breast biopsy from last week.      Review of Systems   Constitutional: Negative for activity change and unexpected weight change.   HENT: Negative for hearing loss, rhinorrhea and trouble swallowing.    Eyes: Negative for discharge and visual disturbance.   Respiratory: Negative for chest tightness and wheezing.    Cardiovascular: Negative for chest pain and palpitations.   Gastrointestinal: Negative for blood in stool, constipation, diarrhea and vomiting.   Endocrine: Negative for polydipsia and polyuria.   Genitourinary: Negative for difficulty urinating, dysuria, hematuria and menstrual problem.   Musculoskeletal: Negative for arthralgias, joint swelling and neck pain.   Neurological: Negative for weakness and headaches.   Psychiatric/Behavioral: Negative for confusion and dysphoric mood.          Results for orders placed or performed during the hospital encounter of 04/29/22   Specimen to Pathology, Radiology Breast, needle biopsy   Result Value Ref Range    Final Pathologic Diagnosis       LEFT BREAST, 12 O'CLOCK DISTORTION, BIOPSY:  Benign breast tissue with features consistent with complex sclerosing lesion  or radial scar.  Negative for atypia or malignancy.  Comment:  The biopsy reveals fibroproliferative changes with aggregates of  sclerotic angulated glands and foci of  "usual ductal hyperplasia (UDH)  radiating towards a vague central region.  P63 immunostains shows intact  myoepithelial cells surrounding sclerotic elements.  CK5/6 reveals patchy  staining within UDH.  The immunostaining supports the interpretation.  Dr. Elayne Mena also reviewed this case and agrees with the diagnosis.      Gross       Container Label and Clinic/AP Number:  [id number]  Received in formalin labeled "left breast 12:00 distortion" is a 3.6 x 1.9 x  0.4 cm aggregate of soft, ragged, tan-yellow, focally tan-white to pink-white  fibrofatty tissue fragments.  Entirely submitted in [id number] and  [id number].  Time collected:  11:05 04/29/2022  Time in formalin:  11:10 04/29/2022  Cold ischemic time:  5 minutes  Gross by: KIANNA Alvarado ([facility])      Disclaimer       Unless the case is a 'gross only' or additional testing only, the final  diagnosis for each specimen is based on a microscopic examination of  appropriate tissue sections.         Objective:   There were no vitals filed for this visit.     Physical Exam  Constitutional:       General: She is not in acute distress.     Appearance: She is well-developed. She is not diaphoretic.      Comments: Exam performed as able, but limited due to the inherent nature of telemedicine visit.    HENT:      Head: Normocephalic and atraumatic.   Eyes:      Conjunctiva/sclera: Conjunctivae normal.   Pulmonary:      Effort: No respiratory distress.      Comments: No audible wheezing noted   Breathing as noted over telemedicine appears normal with no distress  Skin:     Comments: No visible rash noted   Neurological:      Mental Status: She is alert and oriented to person, place, and time.   Psychiatric:         Behavior: Behavior normal.         Thought Content: Thought content normal.         Judgment: Judgment normal.         Assessment:       1. Prediabetes    2. Class 3 severe obesity due to excess calories with serious comorbidity and body mass " index (BMI) of 40.0 to 44.9 in adult    3. Difficulty maintaining weight    4. S/P breast biopsy        Plan:         Prediabetes  Class 3 severe obesity due to excess calories with serious comorbidity and body mass index (BMI) of 40.0 to 44.9 in adult  Difficulty maintaining weight  -     semaglutide (OZEMPIC) 0.25 mg or 0.5 mg(2 mg/1.5 mL) pen injector; Inject 0.25mg weekly x 4 weeks then 0.5mg weekly thereafter  Dispense: 2 pen; Refill: 1    S/P breast biopsy  - pathology reviewed. Pending rec on interval screening but likely yearly.    Patient's questions answered. Plan reviewed with patient at the end of visit. Relevant precautions to chief complaint and reasons to seek medical care or contact the office sooner reviewed with patient.     Follow up if symptoms worsen or fail to improve, for as scheduled.    Future Appointments   Date Time Provider Department Center   2/24/2023  8:00 AM Sissy Zazueta MD [facility]     The patient location is: [place], [place]  The chief complaint leading to consultation is: f/u med    Visit type: audiovisual    Face to Face time with patient: 15  15 minutes of total time spent on the encounter, which includes face to face time and non-face to face time preparing to see the patient (eg, review of tests), Obtaining and/or reviewing separately obtained history, Documenting clinical information in the electronic or other health record, Independently interpreting results (not separately reported) and communicating results to the patient/family/caregiver, or Care coordination (not separately reported).     Each patient to whom he or she provides medical services by telemedicine is:  (1) informed of the relationship between the physician and patient and the respective role of any other health care provider with respect to management of the patient; and (2) notified that he or she may decline to receive medical services by telemedicine and may withdraw from such care at any time.

## 2022-05-06 NOTE — TELEPHONE ENCOUNTER
Contacted patient regarding breast biopsy results.  Patient stated that she was at the [facility] and asked if she could receive a call back about 3 pm.  Will contact patient back around 3 pm as requested.

## 2022-05-09 ENCOUNTER — TELEPHONE (OUTPATIENT)
Dept: SURGERY | Facility: CLINIC | Age: 47
End: 2022-05-09
Payer: COMMERCIAL

## 2022-05-09 ENCOUNTER — PATIENT MESSAGE (OUTPATIENT)
Dept: FAMILY MEDICINE | Facility: CLINIC | Age: 47
End: 2022-05-09
Payer: COMMERCIAL

## 2022-05-09 DIAGNOSIS — E66.01 CLASS 3 SEVERE OBESITY DUE TO EXCESS CALORIES WITH SERIOUS COMORBIDITY AND BODY MASS INDEX (BMI) OF 40.0 TO 44.9 IN ADULT: Primary | ICD-10-CM

## 2022-05-09 DIAGNOSIS — R73.03 PREDIABETES: ICD-10-CM

## 2022-05-09 NOTE — TELEPHONE ENCOUNTER
Called patient with biopsy results from 4/29/2022. Biopsy results showed Radial Scar. Discussed what this means and the results were reviewed by Dr. Wiseman. These results are benign, concordant and a surgical consult is recommended. Patient was scheduled on 5/19/2022 with Dr. Elias. Reviewed Breast center location. Patient verbalized understanding.       Cristal Wiseman MD   5/6/2022  9:30 AM CDT Back to Top        Benign and concordant.  Surgical consultation is recommended for radial scar.

## 2022-05-16 ENCOUNTER — NUTRITION (OUTPATIENT)
Dept: NUTRITION | Facility: CLINIC | Age: 47
End: 2022-05-16
Payer: COMMERCIAL

## 2022-05-16 VITALS — WEIGHT: 266.75 LBS | BODY MASS INDEX: 42.87 KG/M2 | HEIGHT: 66 IN

## 2022-05-16 DIAGNOSIS — Z71.3 DIETARY COUNSELING: ICD-10-CM

## 2022-05-16 DIAGNOSIS — E66.01 CLASS 3 SEVERE OBESITY DUE TO EXCESS CALORIES WITH SERIOUS COMORBIDITY AND BODY MASS INDEX (BMI) OF 40.0 TO 44.9 IN ADULT: Primary | ICD-10-CM

## 2022-05-16 DIAGNOSIS — R73.03 PREDIABETES: ICD-10-CM

## 2022-05-16 PROCEDURE — 97802 PR MED NUTR THER, 1ST, INDIV, EA 15 MIN: ICD-10-PCS | Mod: S$GLB,,, | Performed by: DIETITIAN, REGISTERED

## 2022-05-16 PROCEDURE — 99999 PR PBB SHADOW E&M-EST. PATIENT-LVL III: CPT | Mod: PBBFAC,,,

## 2022-05-16 PROCEDURE — 97802 MEDICAL NUTRITION INDIV IN: CPT | Mod: S$GLB,,, | Performed by: DIETITIAN, REGISTERED

## 2022-05-16 PROCEDURE — 99999 PR PBB SHADOW E&M-EST. PATIENT-LVL III: ICD-10-PCS | Mod: PBBFAC,,,

## 2022-05-16 NOTE — PROGRESS NOTES
"Referring Physician:Sissy Zazueta MD     Reason for visit:  Chief Complaint   Patient presents with    Obesity    prediabetes    Nutrition Counseling      Initial Visit    :1975     Allergies Reviewed  Meds Reviewed    Anthropometrics  Weight:121 kg (266 lb 12.1 oz)  Height:5' 6" (1.676 m)  BMI:Body mass index is 43.06 kg/m².   IBW:   59.0 kg  +/-10%    Meds:  Outpatient Medications Prior to Visit   Medication Sig Dispense Refill    semaglutide (OZEMPIC) 0.25 mg or 0.5 mg(2 mg/1.5 mL) pen injector Inject 0.25mg weekly x 4 weeks then 0.5mg weekly thereafter 2 pen 1     No facility-administered medications prior to visit.       Food/Drug Interactions Noted:  n/a    Vitamins/Supplements/Herbs:  none    Labs:   HgbA1c  5.7  (pt states she is not prediabetic; does have strong family history of diabetes)     Nutrition Prescription: 1770 Kcals/day( 30 kcal/kg IBW),  47-59 g protein( 0.8-1.0 g/kg IBW)     Support System:  Pt does the grocery shopping and cooking for herself and her 20 yo son    Diet Hx:   Pt usually eats only one meal a day.  Works from 4 am - 1 pm at [employer], and brings food if she does eat.  Drinks OJ in the morning, and regular soft drinks occasionally.   Seldom snacks between meals.    Breakfast: may eat grits or a full breakfast at work.  OJ  Lunch:   If she eats breakfast, will skip lunch.    Dinner:   Last night ate out:  [employer] cheeseburger dressed with fries and a jade.    Current activity level and/or physical limitations:  No exercise; plans to get back to walking    Motivation to make changes/anticipated barriers and/or expected adherence:   Pt states she plans to wait until after  to implement any changes to current diet regimen.    Nutrition-Focus Physical Findings:   Pt wearing face covering per COVID19 protocol; pt appears well nourished      Assessment:   Pt states she is going to wait to implement any changes to her diet regimen until after her birthday; is having a " party to celebrate birthday and purchase of her first home.  Plans to enjoy herself and eat/drink whatever she wants.  She asked about detox prior to starting diet regimen changes.  She verbalized understanding of information.    Nutrition Diagnosis:  Obesity RT excess energy intake and physical inactivity AEB BMI > 40    Recommendations:  1500 calorie, low fat, high fiber diet. Avoid fried foods/sugary beverages. Exercise goal:  30 minutes per day, 3-5 days per week.  Handouts provided and reviewed:  Cardiac-TLC Nutrition Therapy; 1500 Calorie Sample 5-Day Menus; Weight Loss Tips; Cooking Tips for Weight Management; Get Fit Shopping List; Eat Fit Plan...Anytime/Anywhere;  Servings of Carbohydrates for Meal Planning; Walking Works; My Plate Planner;  Heart Healthy Eating:  Shopping Tips and Label Reading Tips; Tips to Support Weight Loss; [facility] Snack List for Diabetes      Strategies Implemented:  Portion control; increase physical activity    Consultation Time:20 minutes.  Communicated with referring healthcare provider:  Consult note available in pt's Epic chart per MD discretion  Follow Up:  Pt provided with dietitian contact number and advised to call with questions or make future appointment if further intervention needed.

## 2022-05-16 NOTE — PATIENT INSTRUCTIONS
1500 calorie, low fat, high fiber diet.  Avoid fried foods/sugary beverages.  Exercise goal:  30 minutes per day, 3-5 days per week.
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
## 2022-05-19 ENCOUNTER — OFFICE VISIT (OUTPATIENT)
Dept: SURGERY | Facility: CLINIC | Age: 47
End: 2022-05-19
Payer: COMMERCIAL

## 2022-05-19 VITALS
SYSTOLIC BLOOD PRESSURE: 139 MMHG | BODY MASS INDEX: 42.75 KG/M2 | HEART RATE: 65 BPM | HEIGHT: 66 IN | DIASTOLIC BLOOD PRESSURE: 84 MMHG | WEIGHT: 266 LBS

## 2022-05-19 DIAGNOSIS — N64.89 RADIAL SCAR OF BREAST: Primary | ICD-10-CM

## 2022-05-19 DIAGNOSIS — Z01.818 PRE-OP TESTING: ICD-10-CM

## 2022-05-19 PROCEDURE — 99999 PR PBB SHADOW E&M-EST. PATIENT-LVL III: ICD-10-PCS | Mod: PBBFAC,,, | Performed by: SURGERY

## 2022-05-19 PROCEDURE — 3075F SYST BP GE 130 - 139MM HG: CPT | Mod: CPTII,S$GLB,, | Performed by: SURGERY

## 2022-05-19 PROCEDURE — 3044F PR MOST RECENT HEMOGLOBIN A1C LEVEL <7.0%: ICD-10-PCS | Mod: CPTII,S$GLB,, | Performed by: SURGERY

## 2022-05-19 PROCEDURE — 99204 OFFICE O/P NEW MOD 45 MIN: CPT | Mod: S$GLB,,, | Performed by: SURGERY

## 2022-05-19 PROCEDURE — 3008F BODY MASS INDEX DOCD: CPT | Mod: CPTII,S$GLB,, | Performed by: SURGERY

## 2022-05-19 PROCEDURE — 3079F DIAST BP 80-89 MM HG: CPT | Mod: CPTII,S$GLB,, | Performed by: SURGERY

## 2022-05-19 PROCEDURE — 3044F HG A1C LEVEL LT 7.0%: CPT | Mod: CPTII,S$GLB,, | Performed by: SURGERY

## 2022-05-19 PROCEDURE — 1159F MED LIST DOCD IN RCRD: CPT | Mod: CPTII,S$GLB,, | Performed by: SURGERY

## 2022-05-19 PROCEDURE — 1159F PR MEDICATION LIST DOCUMENTED IN MEDICAL RECORD: ICD-10-PCS | Mod: CPTII,S$GLB,, | Performed by: SURGERY

## 2022-05-19 PROCEDURE — 1160F RVW MEDS BY RX/DR IN RCRD: CPT | Mod: CPTII,S$GLB,, | Performed by: SURGERY

## 2022-05-19 PROCEDURE — 3075F PR MOST RECENT SYSTOLIC BLOOD PRESS GE 130-139MM HG: ICD-10-PCS | Mod: CPTII,S$GLB,, | Performed by: SURGERY

## 2022-05-19 PROCEDURE — 3008F PR BODY MASS INDEX (BMI) DOCUMENTED: ICD-10-PCS | Mod: CPTII,S$GLB,, | Performed by: SURGERY

## 2022-05-19 PROCEDURE — 99999 PR PBB SHADOW E&M-EST. PATIENT-LVL III: CPT | Mod: PBBFAC,,, | Performed by: SURGERY

## 2022-05-19 PROCEDURE — 1160F PR REVIEW ALL MEDS BY PRESCRIBER/CLIN PHARMACIST DOCUMENTED: ICD-10-PCS | Mod: CPTII,S$GLB,, | Performed by: SURGERY

## 2022-05-19 PROCEDURE — 3079F PR MOST RECENT DIASTOLIC BLOOD PRESSURE 80-89 MM HG: ICD-10-PCS | Mod: CPTII,S$GLB,, | Performed by: SURGERY

## 2022-05-19 PROCEDURE — 99204 PR OFFICE/OUTPT VISIT, NEW, LEVL IV, 45-59 MIN: ICD-10-PCS | Mod: S$GLB,,, | Performed by: SURGERY

## 2022-05-19 NOTE — LETTER
May 20, 2022        Sissy Zazueta MD  [address] [facility]  [place] [address]             [facility]  [address]  [place] [address]  Phone: [phone]  Fax: [phone]   Patient: So Winkler   MR Number: [id number]   YOB: 1975   Date of Visit: 5/19/2022       Dear Dr. Zazueta:    Thank you for referring So Winkler to me for evaluation. Below are the relevant portions of my assessment and plan of care.            If you have questions, please do not hesitate to call me. I look forward to following So along with you.    Sincerely,      Dorcas Elias MD           CC  No Recipients

## 2022-05-20 PROBLEM — N64.89 RADIAL SCAR OF BREAST: Status: ACTIVE | Noted: 2022-05-20

## 2022-05-31 ENCOUNTER — HOSPITAL ENCOUNTER (OUTPATIENT)
Dept: RADIOLOGY | Facility: HOSPITAL | Age: 47
Discharge: HOME | End: 2022-05-31
Attending: SURGERY
Payer: COMMERCIAL

## 2022-05-31 DIAGNOSIS — N64.59 ABNORMAL BREAST TISSUE: ICD-10-CM

## 2022-05-31 PROCEDURE — 19281 PERQ DEVICE BREAST 1ST IMAG: CPT | Mod: LT,,, | Performed by: RADIOLOGY

## 2022-05-31 PROCEDURE — 19281 PR PERQ PLCMNT DEV, 1ST LES W/MAMMOGRAPHIC GUIDE: ICD-10-PCS | Mod: LT,,, | Performed by: RADIOLOGY

## 2022-05-31 PROCEDURE — A4648 IMPLANTABLE TISSUE MARKER: HCPCS

## 2022-05-31 PROCEDURE — 25000003 PHARM REV CODE 250: Performed by: SURGERY

## 2022-05-31 RX ORDER — SODIUM BICARBONATE 42 MG/ML
2 INJECTION, SOLUTION INTRAVENOUS ONCE
Status: COMPLETED | OUTPATIENT
Start: 2022-05-31 | End: 2022-05-31

## 2022-05-31 RX ORDER — LIDOCAINE HYDROCHLORIDE 20 MG/ML
8 INJECTION, SOLUTION INFILTRATION; PERINEURAL ONCE
Status: COMPLETED | OUTPATIENT
Start: 2022-05-31 | End: 2022-05-31

## 2022-05-31 RX ADMIN — LIDOCAINE HYDROCHLORIDE 8 ML: 20 INJECTION, SOLUTION INFILTRATION; PERINEURAL at 03:05

## 2022-05-31 RX ADMIN — SODIUM BICARBONATE 2 ML: 42 INJECTION, SOLUTION INTRAVENOUS at 03:05

## 2022-06-02 ENCOUNTER — HOSPITAL ENCOUNTER (OUTPATIENT)
Dept: PREADMISSION TESTING | Facility: OTHER | Age: 47
Discharge: HOME | End: 2022-06-02
Attending: SURGERY
Payer: COMMERCIAL

## 2022-06-02 ENCOUNTER — ANESTHESIA EVENT (OUTPATIENT)
Dept: SURGERY | Facility: OTHER | Age: 47
End: 2022-06-02
Payer: COMMERCIAL

## 2022-06-02 ENCOUNTER — TELEPHONE (OUTPATIENT)
Dept: SURGERY | Facility: CLINIC | Age: 47
End: 2022-06-02
Payer: COMMERCIAL

## 2022-06-02 VITALS
RESPIRATION RATE: 16 BRPM | WEIGHT: 266 LBS | HEART RATE: 66 BPM | HEIGHT: 66 IN | DIASTOLIC BLOOD PRESSURE: 65 MMHG | BODY MASS INDEX: 42.75 KG/M2 | OXYGEN SATURATION: 98 % | TEMPERATURE: 98 F | SYSTOLIC BLOOD PRESSURE: 122 MMHG

## 2022-06-02 DIAGNOSIS — Z01.818 PRE-OP TESTING: ICD-10-CM

## 2022-06-02 LAB
ALBUMIN SERPL BCP-MCNC: 3.6 G/DL (ref 3.5–5.2)
ALP SERPL-CCNC: 96 U/L (ref 55–135)
ALT SERPL W/O P-5'-P-CCNC: 42 U/L (ref 10–44)
ANION GAP SERPL CALC-SCNC: 12 MMOL/L (ref 8–16)
AST SERPL-CCNC: 31 U/L (ref 10–40)
BASOPHILS # BLD AUTO: 0.04 K/UL (ref 0–0.2)
BASOPHILS NFR BLD: 0.7 % (ref 0–1.9)
BILIRUB SERPL-MCNC: 0.5 MG/DL (ref 0.1–1)
BUN SERPL-MCNC: 13 MG/DL (ref 6–20)
CALCIUM SERPL-MCNC: 9.6 MG/DL (ref 8.7–10.5)
CHLORIDE SERPL-SCNC: 106 MMOL/L (ref 95–110)
CO2 SERPL-SCNC: 22 MMOL/L (ref 23–29)
CREAT SERPL-MCNC: 0.7 MG/DL (ref 0.5–1.4)
DIFFERENTIAL METHOD: ABNORMAL
EOSINOPHIL # BLD AUTO: 0.1 K/UL (ref 0–0.5)
EOSINOPHIL NFR BLD: 2.1 % (ref 0–8)
ERYTHROCYTE [DISTWIDTH] IN BLOOD BY AUTOMATED COUNT: 14.2 % (ref 11.5–14.5)
EST. GFR  (AFRICAN AMERICAN): >60 ML/MIN/1.73 M^2
EST. GFR  (NON AFRICAN AMERICAN): >60 ML/MIN/1.73 M^2
GLUCOSE SERPL-MCNC: 85 MG/DL (ref 70–110)
HCT VFR BLD AUTO: 42.4 % (ref 37–48.5)
HGB BLD-MCNC: 13.4 G/DL (ref 12–16)
IMM GRANULOCYTES # BLD AUTO: 0.03 K/UL (ref 0–0.04)
IMM GRANULOCYTES NFR BLD AUTO: 0.5 % (ref 0–0.5)
LYMPHOCYTES # BLD AUTO: 2.5 K/UL (ref 1–4.8)
LYMPHOCYTES NFR BLD: 41.9 % (ref 18–48)
MCH RBC QN AUTO: 27.6 PG (ref 27–31)
MCHC RBC AUTO-ENTMCNC: 31.6 G/DL (ref 32–36)
MCV RBC AUTO: 87 FL (ref 82–98)
MONOCYTES # BLD AUTO: 0.7 K/UL (ref 0.3–1)
MONOCYTES NFR BLD: 11.4 % (ref 4–15)
NEUTROPHILS # BLD AUTO: 2.6 K/UL (ref 1.8–7.7)
NEUTROPHILS NFR BLD: 43.4 % (ref 38–73)
NRBC BLD-RTO: 0 /100 WBC
PLATELET # BLD AUTO: 237 K/UL (ref 150–450)
PMV BLD AUTO: 11.3 FL (ref 9.2–12.9)
POTASSIUM SERPL-SCNC: 4.8 MMOL/L (ref 3.5–5.1)
PROT SERPL-MCNC: 7.4 G/DL (ref 6–8.4)
RBC # BLD AUTO: 4.86 M/UL (ref 4–5.4)
SODIUM SERPL-SCNC: 140 MMOL/L (ref 136–145)
WBC # BLD AUTO: 6.06 K/UL (ref 3.9–12.7)

## 2022-06-02 PROCEDURE — 36415 COLL VENOUS BLD VENIPUNCTURE: CPT | Performed by: SURGERY

## 2022-06-02 PROCEDURE — 80053 COMPREHEN METABOLIC PANEL: CPT | Performed by: SURGERY

## 2022-06-02 PROCEDURE — 85025 COMPLETE CBC W/AUTO DIFF WBC: CPT | Performed by: SURGERY

## 2022-06-02 RX ORDER — FAMOTIDINE 20 MG/1
20 TABLET, FILM COATED ORAL
Status: CANCELLED | OUTPATIENT
Start: 2022-06-02 | End: 2022-06-02

## 2022-06-02 RX ORDER — LIDOCAINE HYDROCHLORIDE 10 MG/ML
0.5 INJECTION, SOLUTION EPIDURAL; INFILTRATION; INTRACAUDAL; PERINEURAL ONCE
Status: CANCELLED | OUTPATIENT
Start: 2022-06-02 | End: 2022-06-02

## 2022-06-02 RX ORDER — ACETAMINOPHEN 500 MG
1000 TABLET ORAL
Status: CANCELLED | OUTPATIENT
Start: 2022-06-02 | End: 2022-06-02

## 2022-06-02 RX ORDER — SODIUM CHLORIDE, SODIUM LACTATE, POTASSIUM CHLORIDE, CALCIUM CHLORIDE 600; 310; 30; 20 MG/100ML; MG/100ML; MG/100ML; MG/100ML
INJECTION, SOLUTION INTRAVENOUS CONTINUOUS
Status: CANCELLED | OUTPATIENT
Start: 2022-06-02

## 2022-06-02 NOTE — DISCHARGE INSTRUCTIONS
Information to Prepare you for your Surgery    PRE-ADMIT TESTING -  [phone]    [address] [facility]          Your surgery has been scheduled at [facility]. We are pleased to have the opportunity to serve you. For Further Information please call [phone].    On the day of surgery please report to the Information Desk on the 1st floor.    CONTACT YOUR PHYSICIAN'S OFFICE THE DAY PRIOR TO YOUR SURGERY TO OBTAIN YOUR ARRIVAL TIME.     The evening before surgery do not eat anything after 9 p.m. ( this includes hard candy, chewing gum and mints).  You may only have GATORADE, POWERADE AND WATER  from 9 p.m. until you leave your home.   DO NOT DRINK ANY LIQUIDS ON THE WAY TO THE HOSPITAL.      Why does your anesthesiologist allow you to drink Gatorade/Powerade before surgery?  Gatorade/Powerade helps to increase your comfort before surgery and to decrease your nausea after surgery. The carbohydrates in Gatorade/Powerade help reduce your body's stress response to surgery.  If you are a diabetic-drink only water prior to surgery.      Current Visitor policy(12/27/2021) - Patients may have 2 visitors pre and post procedure. Only 2 visitors will be allowed in the Surgical building with the patient.     SPECIAL MEDICATION INSTRUCTIONS: TAKE medications checked off by the Anesthesiologist on your Medication List.    Angiogram Patients: Take medications as instructed by your physician, including aspirin.     Surgery Patients:    If you take ASPIRIN - Your PHYSICIAN/SURGEON will need to inform you IF/OR when you need to stop taking aspirin prior to your surgery.     Do Not take any medications containing IBUPROFEN.    Do Not Wear any make-up (especially eye make-up) to surgery. Please remove any false eyelashes or eyelash extensions. If you arrive the day of surgery with makeup/eyelashes on you will be required to remove prior to surgery. (There is a risk of corneal  abrasions if eye makeup/eyelash extensions are not removed)      Leave all valuables at home.   Do Not wear any jewelry or watches, including any metal in body piercings. Jewelry must be removed prior to coming to the hospital.  There is a possibility that rings that are unable to be removed may be cut off if they are on the surgical extremity.    Please remove all hair extensions, wigs, clips and any other metal accessories/ ornaments from your hair.  These items may pose a flammable/fire risk in Surgery and must be removed.    Do not shave your surgical area at least 5 days prior to your surgery. The surgical prep will be performed at the hospital according to Infection Control regulations.    Contact Lens must be removed before surgery. Either do not wear the contact lens or bring a case and solution for storage.  Please bring a container for eyeglasses or dentures as required.  Bring any paperwork your physician has provided, such as consent forms,  history and physicals, doctor's orders, etc.   Bring comfortable clothes that are loose fitting to wear upon discharge. Take into consideration the type of surgery being performed.  Maintain your diet as advised per your physician the day prior to surgery.      Adequate rest the night before surgery is advised.   Park in the Parking lot behind the hospital or in the [employer] [facility] across the street from the parking lot. Parking is complimentary.  If you will be discharged the same day as your procedure, please arrange for a responsible adult to drive you home or to accompany you if traveling by taxi.   YOU WILL NOT BE PERMITTED TO DRIVE OR TO LEAVE THE HOSPITAL ALONE AFTER SURGERY.   If you are being discharged the same day, it is strongly recommended that you arrange for someone to remain with you for the first 24 hrs following your surgery.    The Surgeon will speak to your family/visitor after your surgery regarding the outcome of your surgery and post op  care.  The Surgeon may speak to you after your surgery, but there is a possibility you may not remember the details.  Please check with your family members regarding the conversation with the Surgeon.    We strongly recommend whoever is bringing you home be present for discharge instructions.  This will ensure a thorough understanding for your post op home care.    ALL CHILDREN MUST ALWAYS BE ACCOMPANIED BY AN ADULT.    Visitors-Refer to current Visitor policy handouts.    Thank you for your cooperation.  The Staff of [facility].            Bathing Instructions with Hibiclens    Shower the evening before and morning of your procedure with Hibiclens:  Wash your face with water and your regular face wash/soap  Apply Hibiclens directly on your skin or on a wet washcloth and wash gently. When showering: Move away from the shower stream when applying Hibiclens to avoid rinsing off too soon.  Rinse thoroughly with warm water  Do not dilute Hibiclens        Dry off as usual, do not use any deodorant, powder, body lotions, perfume, after shave or cologne.

## 2022-06-02 NOTE — TELEPHONE ENCOUNTER
Confirmed arrival time for surgery at the [facility]  on 6/03/22. Arrival time is for 12:30 surgery is for 2:30 pm. Nothing eat after 9 pm this evening 6/02/22 . Patient may have clear liquids up until 4 hour's before surgery. Informed patient that she may have 2 people with the morning of surgery . Leave all jewelry home also please wear a button down shirt the morning of surgery.  voiced understanding to this call.

## 2022-06-03 ENCOUNTER — ANESTHESIA (OUTPATIENT)
Dept: SURGERY | Facility: OTHER | Age: 47
End: 2022-06-03
Payer: COMMERCIAL

## 2022-06-03 ENCOUNTER — HOSPITAL ENCOUNTER (OUTPATIENT)
Dept: RADIOLOGY | Facility: OTHER | Age: 47
Discharge: HOME | End: 2022-06-03
Attending: SURGERY | Admitting: SURGERY
Payer: COMMERCIAL

## 2022-06-03 ENCOUNTER — HOSPITAL ENCOUNTER (OUTPATIENT)
Facility: OTHER | Age: 47
Discharge: HOME | End: 2022-06-03
Attending: SURGERY | Admitting: SURGERY
Payer: COMMERCIAL

## 2022-06-03 DIAGNOSIS — Z01.818 PREOP TESTING: ICD-10-CM

## 2022-06-03 DIAGNOSIS — N64.89 RADIAL SCAR OF BREAST: Primary | ICD-10-CM

## 2022-06-03 DIAGNOSIS — N64.59 ABNORMAL BREAST TISSUE: ICD-10-CM

## 2022-06-03 LAB
CTP QC/QA: YES
SARS-COV-2 AG RESP QL IA.RAPID: NEGATIVE

## 2022-06-03 PROCEDURE — 63600175 PHARM REV CODE 636 W HCPCS: Performed by: STUDENT IN AN ORGANIZED HEALTH CARE EDUCATION/TRAINING PROGRAM

## 2022-06-03 PROCEDURE — 71000016 HC POSTOP RECOV ADDL HR: Performed by: SURGERY

## 2022-06-03 PROCEDURE — 71000015 HC POSTOP RECOV 1ST HR: Performed by: SURGERY

## 2022-06-03 PROCEDURE — 25000003 PHARM REV CODE 250: Performed by: ANESTHESIOLOGY

## 2022-06-03 PROCEDURE — 76098 X-RAY EXAM SURGICAL SPECIMEN: CPT | Mod: 26,,, | Performed by: RADIOLOGY

## 2022-06-03 PROCEDURE — 76098 X-RAY EXAM SURGICAL SPECIMEN: CPT | Mod: TC

## 2022-06-03 PROCEDURE — 19125 EXCISION BREAST LESION: CPT | Mod: LT,,, | Performed by: SURGERY

## 2022-06-03 PROCEDURE — 36000706: Performed by: SURGERY

## 2022-06-03 PROCEDURE — 63600175 PHARM REV CODE 636 W HCPCS: Performed by: NURSE ANESTHETIST, CERTIFIED REGISTERED

## 2022-06-03 PROCEDURE — 63600175 PHARM REV CODE 636 W HCPCS: Performed by: ANESTHESIOLOGY

## 2022-06-03 PROCEDURE — 71000033 HC RECOVERY, INTIAL HOUR: Performed by: SURGERY

## 2022-06-03 PROCEDURE — 37000009 HC ANESTHESIA EA ADD 15 MINS: Performed by: SURGERY

## 2022-06-03 PROCEDURE — 25000003 PHARM REV CODE 250: Performed by: NURSE ANESTHETIST, CERTIFIED REGISTERED

## 2022-06-03 PROCEDURE — 88307 TISSUE EXAM BY PATHOLOGIST: CPT | Mod: 26,,, | Performed by: PATHOLOGY

## 2022-06-03 PROCEDURE — 88307 PR  SURG PATH,LEVEL V: ICD-10-PCS | Mod: 26,,, | Performed by: PATHOLOGY

## 2022-06-03 PROCEDURE — 25000003 PHARM REV CODE 250: Performed by: SURGERY

## 2022-06-03 PROCEDURE — C1894 INTRO/SHEATH, NON-LASER: HCPCS | Performed by: SURGERY

## 2022-06-03 PROCEDURE — 36000707: Performed by: SURGERY

## 2022-06-03 PROCEDURE — 19125 PR EXCISE BREAST LES W XRAY MARKER: ICD-10-PCS | Mod: LT,,, | Performed by: SURGERY

## 2022-06-03 PROCEDURE — 88307 TISSUE EXAM BY PATHOLOGIST: CPT | Performed by: PATHOLOGY

## 2022-06-03 PROCEDURE — 76098 MAMMO BREAST SPECIMEN: ICD-10-PCS | Mod: 26,,, | Performed by: RADIOLOGY

## 2022-06-03 PROCEDURE — 37000008 HC ANESTHESIA 1ST 15 MINUTES: Performed by: SURGERY

## 2022-06-03 RX ORDER — LIDOCAINE HYDROCHLORIDE 10 MG/ML
0.5 INJECTION, SOLUTION EPIDURAL; INFILTRATION; INTRACAUDAL; PERINEURAL ONCE
Status: DISCONTINUED | OUTPATIENT
Start: 2022-06-03 | End: 2022-06-03 | Stop reason: HOSPADM

## 2022-06-03 RX ORDER — FENTANYL CITRATE 50 UG/ML
INJECTION, SOLUTION INTRAMUSCULAR; INTRAVENOUS
Status: DISCONTINUED | OUTPATIENT
Start: 2022-06-03 | End: 2022-06-03

## 2022-06-03 RX ORDER — FAMOTIDINE 20 MG/1
20 TABLET, FILM COATED ORAL
Status: COMPLETED | OUTPATIENT
Start: 2022-06-03 | End: 2022-06-03

## 2022-06-03 RX ORDER — PROCHLORPERAZINE EDISYLATE 5 MG/ML
5 INJECTION INTRAMUSCULAR; INTRAVENOUS EVERY 30 MIN PRN
Status: DISCONTINUED | OUTPATIENT
Start: 2022-06-03 | End: 2022-06-03 | Stop reason: HOSPADM

## 2022-06-03 RX ORDER — LIDOCAINE HYDROCHLORIDE 20 MG/ML
INJECTION INTRAVENOUS
Status: DISCONTINUED | OUTPATIENT
Start: 2022-06-03 | End: 2022-06-03

## 2022-06-03 RX ORDER — SODIUM CHLORIDE 9 MG/ML
INJECTION, SOLUTION INTRAVENOUS CONTINUOUS
Status: DISCONTINUED | OUTPATIENT
Start: 2022-06-03 | End: 2022-06-03 | Stop reason: HOSPADM

## 2022-06-03 RX ORDER — OXYCODONE HYDROCHLORIDE 5 MG/1
5 TABLET ORAL
Status: DISCONTINUED | OUTPATIENT
Start: 2022-06-03 | End: 2022-06-03 | Stop reason: HOSPADM

## 2022-06-03 RX ORDER — OXYCODONE HYDROCHLORIDE 5 MG/1
5 TABLET ORAL EVERY 6 HOURS PRN
Qty: 10 TABLET | Refills: 0 | Status: SHIPPED | OUTPATIENT
Start: 2022-06-03 | End: 2022-07-20

## 2022-06-03 RX ORDER — BUPIVACAINE HYDROCHLORIDE 2.5 MG/ML
INJECTION, SOLUTION EPIDURAL; INFILTRATION; INTRACAUDAL
Status: DISCONTINUED | OUTPATIENT
Start: 2022-06-03 | End: 2022-06-03 | Stop reason: HOSPADM

## 2022-06-03 RX ORDER — CEFAZOLIN SODIUM 1 G/3ML
2 INJECTION, POWDER, FOR SOLUTION INTRAMUSCULAR; INTRAVENOUS
Status: COMPLETED | OUTPATIENT
Start: 2022-06-03 | End: 2022-06-03

## 2022-06-03 RX ORDER — SODIUM CHLORIDE, SODIUM LACTATE, POTASSIUM CHLORIDE, CALCIUM CHLORIDE 600; 310; 30; 20 MG/100ML; MG/100ML; MG/100ML; MG/100ML
INJECTION, SOLUTION INTRAVENOUS CONTINUOUS
Status: DISCONTINUED | OUTPATIENT
Start: 2022-06-03 | End: 2022-06-03 | Stop reason: HOSPADM

## 2022-06-03 RX ORDER — ACETAMINOPHEN 500 MG
1000 TABLET ORAL
Status: COMPLETED | OUTPATIENT
Start: 2022-06-03 | End: 2022-06-03

## 2022-06-03 RX ORDER — MEPERIDINE HYDROCHLORIDE 25 MG/ML
12.5 INJECTION INTRAMUSCULAR; INTRAVENOUS; SUBCUTANEOUS ONCE AS NEEDED
Status: DISCONTINUED | OUTPATIENT
Start: 2022-06-03 | End: 2022-06-03 | Stop reason: HOSPADM

## 2022-06-03 RX ORDER — SODIUM CHLORIDE 0.9 % (FLUSH) 0.9 %
3 SYRINGE (ML) INJECTION
Status: DISCONTINUED | OUTPATIENT
Start: 2022-06-03 | End: 2022-06-03 | Stop reason: HOSPADM

## 2022-06-03 RX ORDER — HYDROMORPHONE HYDROCHLORIDE 2 MG/ML
INJECTION, SOLUTION INTRAMUSCULAR; INTRAVENOUS; SUBCUTANEOUS
Status: DISCONTINUED | OUTPATIENT
Start: 2022-06-03 | End: 2022-06-03

## 2022-06-03 RX ORDER — EPHEDRINE SULFATE 50 MG/ML
INJECTION, SOLUTION INTRAVENOUS
Status: DISCONTINUED | OUTPATIENT
Start: 2022-06-03 | End: 2022-06-03

## 2022-06-03 RX ORDER — HYDROMORPHONE HYDROCHLORIDE 2 MG/ML
0.4 INJECTION, SOLUTION INTRAMUSCULAR; INTRAVENOUS; SUBCUTANEOUS EVERY 5 MIN PRN
Status: DISCONTINUED | OUTPATIENT
Start: 2022-06-03 | End: 2022-06-03 | Stop reason: HOSPADM

## 2022-06-03 RX ORDER — MIDAZOLAM HYDROCHLORIDE 1 MG/ML
INJECTION INTRAMUSCULAR; INTRAVENOUS
Status: DISCONTINUED | OUTPATIENT
Start: 2022-06-03 | End: 2022-06-03

## 2022-06-03 RX ORDER — PROPOFOL 10 MG/ML
VIAL (ML) INTRAVENOUS
Status: DISCONTINUED | OUTPATIENT
Start: 2022-06-03 | End: 2022-06-03

## 2022-06-03 RX ADMIN — SODIUM CHLORIDE, SODIUM LACTATE, POTASSIUM CHLORIDE, AND CALCIUM CHLORIDE: 600; 310; 30; 20 INJECTION, SOLUTION INTRAVENOUS at 01:06

## 2022-06-03 RX ADMIN — LIDOCAINE HYDROCHLORIDE 50 MG: 20 INJECTION, SOLUTION INTRAVENOUS at 01:06

## 2022-06-03 RX ADMIN — HYDROMORPHONE HYDROCHLORIDE 0.4 MG: 2 INJECTION INTRAMUSCULAR; INTRAVENOUS; SUBCUTANEOUS at 02:06

## 2022-06-03 RX ADMIN — HYDROMORPHONE HYDROCHLORIDE 0.4 MG: 2 INJECTION INTRAMUSCULAR; INTRAVENOUS; SUBCUTANEOUS at 03:06

## 2022-06-03 RX ADMIN — ACETAMINOPHEN 1000 MG: 500 TABLET, FILM COATED ORAL at 01:06

## 2022-06-03 RX ADMIN — EPHEDRINE SULFATE 10 MG: 50 INJECTION INTRAVENOUS at 02:06

## 2022-06-03 RX ADMIN — CEFAZOLIN 2 G: 330 INJECTION, POWDER, FOR SOLUTION INTRAMUSCULAR; INTRAVENOUS at 02:06

## 2022-06-03 RX ADMIN — OXYCODONE HYDROCHLORIDE 5 MG: 5 TABLET ORAL at 03:06

## 2022-06-03 RX ADMIN — PROPOFOL 20 MG: 10 INJECTION, EMULSION INTRAVENOUS at 03:06

## 2022-06-03 RX ADMIN — PROPOFOL 200 MG: 10 INJECTION, EMULSION INTRAVENOUS at 01:06

## 2022-06-03 RX ADMIN — MIDAZOLAM HYDROCHLORIDE 2 MG: 1 INJECTION, SOLUTION INTRAMUSCULAR; INTRAVENOUS at 01:06

## 2022-06-03 RX ADMIN — FENTANYL CITRATE 100 MCG: 50 INJECTION, SOLUTION INTRAMUSCULAR; INTRAVENOUS at 01:06

## 2022-06-03 RX ADMIN — LIDOCAINE HYDROCHLORIDE 50 MG: 20 INJECTION, SOLUTION INTRAVENOUS at 03:06

## 2022-06-03 RX ADMIN — CARBOXYMETHYLCELLULOSE SODIUM 2 DROP: 2.5 SOLUTION/ DROPS OPHTHALMIC at 02:06

## 2022-06-03 RX ADMIN — FAMOTIDINE 20 MG: 20 TABLET ORAL at 01:06

## 2022-06-03 NOTE — BRIEF OP NOTE
[facility] - Surgery ([place])  Brief Operative Note    Surgery Date: 6/3/2022     Surgeon(s) and Role:     * Dorcas Ferrer MD - Primary    Assisting Surgeon: Jesus Trent MD    Pre-op Diagnosis:  Radial scar of breast [N64.89]    Post-op Diagnosis:  Post-Op Diagnosis Codes:     * Radial scar of breast [N64.89]    Procedure(s) (LRB):  EXCISIONAL BIOPSY-Left with marker placement (Left)    Anesthesia: General    Operative Findings: Excisional biopsy of left breast lesion; confirmed in [place]. Minimal blood loss. 1 specimen.    Estimated Blood Loss: minimal         Specimens:   Specimen (24h ago, onward)                 Start     Ordered    06/03/22 1448  Specimen to Pathology, Surgery Breast  Once        Comments: Pre-op Diagnosis: Radial scar of breast [N64.89]Procedure(s):EXCISIONAL BIOPSY-Left with marker placement Number of specimens: 1Name of specimens: left excisional biopsy, short superior long lateral     References:    Click here for ordering Quick Tip   Question Answer Comment   Procedure Type: Breast    Specimen Class: Known or suspected malignancy    Which provider would you like to cc? DORCAS FERRER    Release to patient Immediate        06/03/22 1448                      Discharge Note    OUTCOME: Patient tolerated treatment/procedure well without complication and is now ready for discharge.    DISPOSITION: Home or Self Care    FINAL DIAGNOSIS:  Radial scar of breast    FOLLOWUP: In clinic in 2 weeks    DISCHARGE INSTRUCTIONS:    Discharge Procedure Orders   Notify your health care provider if you experience any of the following:  temperature >100.4     Notify your health care provider if you experience any of the following:  persistent nausea and vomiting or diarrhea     Notify your health care provider if you experience any of the following:  severe uncontrolled pain     Notify your health care provider if you experience any of the following:  redness, tenderness, or signs of infection  (pain, swelling, redness, odor or green/yellow discharge around incision site)     Notify your health care provider if you experience any of the following:  worsening rash     Notify your health care provider if you experience any of the following:  persistent dizziness, light-headedness, or visual disturbances

## 2022-06-03 NOTE — PATIENT INSTRUCTIONS
POSTOPERATIVE INSTRUCTIONS FOLLOWING BIOPSY OR LUMPECTOMY     The following are post-operative instructions that will help you to recover from your surgery.  Please read over these instructions carefully and contact us if we can answer any of your questions or concerns.     Dressing/breast binder (surgi-bra)  A surgical bra may be placed around your chest after your surgery.  If you are given the bra, please wear it as close to 24 hours a day as possible until your post-operative clinic appointment.  If the elastic around the bra irritates your skin, you may wear a soft t-shirt underneath the bra.  You may go without wearing the bra long enough to shower, to launder and dry the bra.  If the bra is extremely uncomfortable, you may wear a supportive sports bra instead after 2 days.  You may shower the day after surgery.  Do not take a tub bath and do not soak the surgical site.     Activity   You should be able to return to your regular activities 2 days after your surgery.  However, do not engage in strenuous activities in which you use your upper body such as:  golf, tennis, aerobics, washing windows, raking the yard, mopping, vacuuming, heavy lifting (e.g children) until you are seen for your follow-up appointment in clinic.     Medication for pain  You may find that over the counter pain medications may be sufficient for your pain.  You will be given a prescription for pain medication for more severe pain.  You should not drive or operate machinery while taking these.  Please take narcotics with food.  Narcotics can cause, or worse, constipation.  You will need to increase your fluid intake, eat high fiber foods (such as fruits and bran) and make sure that you are up and walking. You may need to take an over the counter stool softener for constipation.     Please report the following:  Temperature greater than 101 degrees  Discharge or bad odor from the wound  Excessive bleeding, such as bloody dressing or extreme  bruising  Redness at incision and/or drain sites  Swelling or buildup of fluid around incision    Additional information  I will see you approximately 2 weeks following your surgery.  If this follow-up appointment has not been made, please call the office.     If you have any questions or problems, please call my office or my nurse.     MD Kelly Reyse, RN  [phone]     After hours and on weekends, you may call the main [facility] line at [phone] and ask to have the general surgery resident paged or have me paged.

## 2022-06-03 NOTE — INTERVAL H&P NOTE
The patient has been examined and the H&P has been reviewed:    I concur with the findings and no changes have occurred since H&P was written.    Surgery risks, benefits and alternative options discussed and understood by patient/family.      All questions and concerns addressed. Consent obtained in clinic. Correct side identified and marked in preop. Patient would like to proceed with planned operation.    Active Hospital Problems    Diagnosis  POA    *Radial scar of breast [N64.89]  Yes      Resolved Hospital Problems   No resolved problems to display.

## 2022-06-03 NOTE — ANESTHESIA PROCEDURE NOTES
Intubation    Date/Time: 6/3/2022 2:00 PM  Performed by: Amna Walls CRNA  Authorized by: Domingo Chau MD     Intubation:     Induction:  Intravenous    Intubated:  Postinduction    Mask Ventilation:  Easy mask    Attempts:  1    Attempted By:  CRNA    Difficult Airway Encountered?: No      Complications:  None    Airway Device:  Supraglottic airway/LMA    Airway Device Size:  5.0    Secured at:  The lips    Placement Verified By:  Capnometry    Complicating Factors:  Obesity    Findings Post-Intubation:  Atraumatic/condition of teeth unchanged

## 2022-06-03 NOTE — OP NOTE
DATE OF PROCEDURE: 06/03/2022    SURGEON: Surgeon(s) and Role:     * Dorcas Elias MD - Primary  Resident:  Jesus Oakley    PREOPERATIVE DIAGNOSIS: Radial scar of breast [N64.89]    POSTOPERATIVE DIAGNOSIS: same    ANESTHESIA: local and general    PROCEDURES PERFORMED:    left breast reflector localization excisional biopsy    CLINICAL HISTORY AND INDICATIONS FOR SURGERY:  So Winkler is a 47 y.o. female who presents with complex sclerosing lesion  All the attendant risks, benefits, and potential complications of surgery were discussed with her and she wanted to proceed with surgery and consented to surgery.    PROCEDURE IN DETAIL:   The patient underwent informed consent.  The reflector was placed in the upper outer quadrant of the left breast. The reflector localization films were reviewed.    She was then brought to the Operating Room and placed in the supine position. Anesthesia with local and general was administered.  The left breast, anterior chest, right arm and axilla were then prepped and draped in a sterile fashion.     We turned our attention to the left breast. Local anesthetic was injected in the area. An incision was made in the upper outer quadrant of the left breast over the anticipated tract of the lesion in a periareolar fashion. The specimen was dissected circumferentially around the lesion.  The reflector localization lumpectomy specimen was marked using short superior long lateral.  It was then submitted for specimen radiograph, which confirmed the clip, reflector and area of interest within the specimen.     Within the lumpectomy cavity, hemostasis was achieved with cautery. The wound was irrigated until clear. There was no evidence of bleeding. It was closed in multiple layers with deep dermal and subcutaneous interrupted Vicryl sutures and a running 4-0 vicryl subcuticular skin closure.    Dermabond applied. Sterile fluff gauze was placed and a post-procedure bra was placed.  She tolerated the procedure well without complication and was turned over to Anesthesia for transport to the recovery area in a satisfactory condition. All specimens were sent to Pathology for permanent sectioning.    ESTIMATED BLOOD LOSS:   Minimal    COMPLICATIONS: none    DISPOSITION:  PACU--hemodynamically stable

## 2022-06-03 NOTE — TRANSFER OF CARE
"Anesthesia Transfer of Care Note    Patient: So Winkler    Procedure(s) Performed: Procedure(s) (LRB):  EXCISIONAL BIOPSY-Left with marker placement (Left)    Patient location: PACU    Anesthesia Type: general    Transport from OR: Transported from OR on 2-3 L/min O2 by NC with adequate spontaneous ventilation    Post pain: adequate analgesia    Post assessment: no apparent anesthetic complications    Post vital signs: stable    Level of consciousness: awake    Nausea/Vomiting: no nausea/vomiting    Complications: none    Transfer of care protocol was followed      Last vitals:   Visit Vitals  /74 (BP Location: Left arm, Patient Position: Sitting)   Pulse 68   Temp 37.1 °C (98.8 °F) (Oral)   Resp 18   Ht 5' 6" (1.676 m)   Wt 120.7 kg (266 lb)   LMP  (LMP Unknown)   SpO2 99%   Breastfeeding No   BMI 42.93 kg/m²     "

## 2022-06-03 NOTE — ANESTHESIA POSTPROCEDURE EVALUATION
Anesthesia Post Evaluation    Patient: So Winkler    Procedure(s) Performed: Procedure(s) (LRB):  EXCISIONAL BIOPSY-Left with marker placement (Left)    Final Anesthesia Type: general      Patient location during evaluation: PACU  Patient participation: Yes- Able to Participate  Level of consciousness: awake and alert  Post-procedure vital signs: reviewed and stable  Pain management: adequate  Airway patency: patent    PONV status at discharge: No PONV  Anesthetic complications: no      Cardiovascular status: blood pressure returned to baseline  Respiratory status: unassisted  Hydration status: euvolemic  Follow-up not needed.          Vitals Value Taken Time   /65 06/03/22 1635   Temp 36.4 °C (97.6 °F) 06/03/22 1605   Pulse 65 06/03/22 1635   Resp 16 06/03/22 1635   SpO2 92 % 06/03/22 1635         Event Time   Out of Recovery 16:00:29         Pain/Jayant Score: Pain Rating Prior to Med Admin: 4 (6/3/2022  3:35 PM)  Pain Rating Post Med Admin: 3 (6/3/2022  3:50 PM)  Jayant Score: 9 (6/3/2022  4:35 PM)

## 2022-06-04 NOTE — PLAN OF CARE
So Winkler has met all discharge criteria from Phase II. Vital Signs are stable, ambulating  without difficulty. Discharge instructions given, patient verbalized understanding. Discharged from facility via wheelchair in stable condition.

## 2022-06-06 VITALS
TEMPERATURE: 98 F | DIASTOLIC BLOOD PRESSURE: 79 MMHG | HEIGHT: 66 IN | BODY MASS INDEX: 42.75 KG/M2 | WEIGHT: 266 LBS | RESPIRATION RATE: 16 BRPM | HEART RATE: 67 BPM | SYSTOLIC BLOOD PRESSURE: 124 MMHG | OXYGEN SATURATION: 95 %

## 2022-06-08 LAB
FINAL PATHOLOGIC DIAGNOSIS: NORMAL
GROSS: NORMAL
Lab: NORMAL

## 2022-06-09 ENCOUNTER — HOSPITAL ENCOUNTER (EMERGENCY)
Facility: HOSPITAL | Age: 47
Discharge: HOME | End: 2022-06-09
Attending: FAMILY MEDICINE
Payer: COMMERCIAL

## 2022-06-09 ENCOUNTER — OFFICE VISIT (OUTPATIENT)
Dept: SURGERY | Facility: CLINIC | Age: 47
End: 2022-06-09
Payer: COMMERCIAL

## 2022-06-09 VITALS
RESPIRATION RATE: 19 BRPM | TEMPERATURE: 98 F | BODY MASS INDEX: 41.48 KG/M2 | HEART RATE: 56 BPM | OXYGEN SATURATION: 99 % | WEIGHT: 257 LBS | SYSTOLIC BLOOD PRESSURE: 171 MMHG | DIASTOLIC BLOOD PRESSURE: 93 MMHG

## 2022-06-09 VITALS
DIASTOLIC BLOOD PRESSURE: 72 MMHG | WEIGHT: 257 LBS | BODY MASS INDEX: 41.3 KG/M2 | SYSTOLIC BLOOD PRESSURE: 131 MMHG | HEART RATE: 66 BPM | HEIGHT: 66 IN

## 2022-06-09 DIAGNOSIS — Z09 POSTOP CHECK: Primary | ICD-10-CM

## 2022-06-09 DIAGNOSIS — Z98.890 STATUS POST SURGERY: ICD-10-CM

## 2022-06-09 DIAGNOSIS — N61.1 BREAST ABSCESS: Primary | ICD-10-CM

## 2022-06-09 LAB
ALBUMIN SERPL BCP-MCNC: 4.1 G/DL (ref 3.5–5.2)
ALP SERPL-CCNC: 80 U/L (ref 38–126)
ALT SERPL W/O P-5'-P-CCNC: 39 U/L (ref 10–44)
ANION GAP SERPL CALC-SCNC: 7 MMOL/L (ref 8–16)
AST SERPL-CCNC: 39 U/L (ref 15–46)
BASOPHILS # BLD AUTO: 0.04 K/UL (ref 0–0.2)
BASOPHILS NFR BLD: 0.7 % (ref 0–1.9)
BILIRUB SERPL-MCNC: 1.1 MG/DL (ref 0.1–1)
CALCIUM SERPL-MCNC: 8.8 MG/DL (ref 8.7–10.5)
CHLORIDE SERPL-SCNC: 104 MMOL/L (ref 95–110)
CO2 SERPL-SCNC: 27 MMOL/L (ref 23–29)
CREAT SERPL-MCNC: 0.61 MG/DL (ref 0.5–1.4)
DIFFERENTIAL METHOD: ABNORMAL
EOSINOPHIL # BLD AUTO: 0.3 K/UL (ref 0–0.5)
EOSINOPHIL NFR BLD: 4.4 % (ref 0–8)
ERYTHROCYTE [DISTWIDTH] IN BLOOD BY AUTOMATED COUNT: 13.8 % (ref 11.5–14.5)
EST. GFR  (AFRICAN AMERICAN): >60 ML/MIN/1.73 M^2
EST. GFR  (NON AFRICAN AMERICAN): >60 ML/MIN/1.73 M^2
GLUCOSE SERPL-MCNC: 109 MG/DL (ref 70–110)
HCT VFR BLD AUTO: 41 % (ref 37–48.5)
HGB BLD-MCNC: 13 G/DL (ref 12–16)
IMM GRANULOCYTES # BLD AUTO: 0.03 K/UL (ref 0–0.04)
IMM GRANULOCYTES NFR BLD AUTO: 0.5 % (ref 0–0.5)
LACTATE SERPL-SCNC: 1.4 MMOL/L (ref 0.5–2.2)
LYMPHOCYTES # BLD AUTO: 2 K/UL (ref 1–4.8)
LYMPHOCYTES NFR BLD: 34.4 % (ref 18–48)
MAGNESIUM SERPL-MCNC: 1.8 MG/DL (ref 1.6–2.6)
MCH RBC QN AUTO: 27.5 PG (ref 27–31)
MCHC RBC AUTO-ENTMCNC: 31.7 G/DL (ref 32–36)
MCV RBC AUTO: 87 FL (ref 82–98)
MONOCYTES # BLD AUTO: 0.8 K/UL (ref 0.3–1)
MONOCYTES NFR BLD: 14 % (ref 4–15)
NEUTROPHILS # BLD AUTO: 2.6 K/UL (ref 1.8–7.7)
NEUTROPHILS NFR BLD: 46 % (ref 38–73)
NRBC BLD-RTO: 0 /100 WBC
PLATELET # BLD AUTO: 253 K/UL (ref 150–450)
PMV BLD AUTO: 11.6 FL (ref 9.2–12.9)
POTASSIUM SERPL-SCNC: 5.4 MMOL/L (ref 3.5–5.1)
PROT SERPL-MCNC: 8 G/DL (ref 6–8.4)
RBC # BLD AUTO: 4.72 M/UL (ref 4–5.4)
SODIUM SERPL-SCNC: 138 MMOL/L (ref 136–145)
UUN UR-MCNC: 15 MG/DL (ref 7–17)
WBC # BLD AUTO: 5.73 K/UL (ref 3.9–12.7)

## 2022-06-09 PROCEDURE — 96374 THER/PROPH/DIAG INJ IV PUSH: CPT | Mod: ER

## 2022-06-09 PROCEDURE — 3044F PR MOST RECENT HEMOGLOBIN A1C LEVEL <7.0%: ICD-10-PCS | Mod: CPTII,S$GLB,, | Performed by: NURSE PRACTITIONER

## 2022-06-09 PROCEDURE — 96375 TX/PRO/DX INJ NEW DRUG ADDON: CPT | Mod: ER

## 2022-06-09 PROCEDURE — 99999 PR PBB SHADOW E&M-EST. PATIENT-LVL III: CPT | Mod: PBBFAC,,, | Performed by: NURSE PRACTITIONER

## 2022-06-09 PROCEDURE — 99999 PR PBB SHADOW E&M-EST. PATIENT-LVL III: ICD-10-PCS | Mod: PBBFAC,,, | Performed by: NURSE PRACTITIONER

## 2022-06-09 PROCEDURE — 3044F HG A1C LEVEL LT 7.0%: CPT | Mod: CPTII,S$GLB,, | Performed by: NURSE PRACTITIONER

## 2022-06-09 PROCEDURE — 3078F DIAST BP <80 MM HG: CPT | Mod: CPTII,S$GLB,, | Performed by: NURSE PRACTITIONER

## 2022-06-09 PROCEDURE — 99284 EMERGENCY DEPT VISIT MOD MDM: CPT | Mod: 25,ER

## 2022-06-09 PROCEDURE — 3008F BODY MASS INDEX DOCD: CPT | Mod: CPTII,S$GLB,, | Performed by: NURSE PRACTITIONER

## 2022-06-09 PROCEDURE — 25000003 PHARM REV CODE 250: Mod: ER | Performed by: FAMILY MEDICINE

## 2022-06-09 PROCEDURE — 83605 ASSAY OF LACTIC ACID: CPT | Mod: ER | Performed by: FAMILY MEDICINE

## 2022-06-09 PROCEDURE — 85025 COMPLETE CBC W/AUTO DIFF WBC: CPT | Mod: ER

## 2022-06-09 PROCEDURE — 99024 POSTOP FOLLOW-UP VISIT: CPT | Mod: S$GLB,,, | Performed by: NURSE PRACTITIONER

## 2022-06-09 PROCEDURE — 3075F PR MOST RECENT SYSTOLIC BLOOD PRESS GE 130-139MM HG: ICD-10-PCS | Mod: CPTII,S$GLB,, | Performed by: NURSE PRACTITIONER

## 2022-06-09 PROCEDURE — 63600175 PHARM REV CODE 636 W HCPCS: Mod: ER

## 2022-06-09 PROCEDURE — 99024 PR POST-OP FOLLOW-UP VISIT: ICD-10-PCS | Mod: S$GLB,,, | Performed by: NURSE PRACTITIONER

## 2022-06-09 PROCEDURE — 80053 COMPREHEN METABOLIC PANEL: CPT | Mod: ER

## 2022-06-09 PROCEDURE — 1159F MED LIST DOCD IN RCRD: CPT | Mod: CPTII,S$GLB,, | Performed by: NURSE PRACTITIONER

## 2022-06-09 PROCEDURE — 3078F PR MOST RECENT DIASTOLIC BLOOD PRESSURE < 80 MM HG: ICD-10-PCS | Mod: CPTII,S$GLB,, | Performed by: NURSE PRACTITIONER

## 2022-06-09 PROCEDURE — 87040 BLOOD CULTURE FOR BACTERIA: CPT | Mod: 59,ER | Performed by: FAMILY MEDICINE

## 2022-06-09 PROCEDURE — 3008F PR BODY MASS INDEX (BMI) DOCUMENTED: ICD-10-PCS | Mod: CPTII,S$GLB,, | Performed by: NURSE PRACTITIONER

## 2022-06-09 PROCEDURE — 83735 ASSAY OF MAGNESIUM: CPT | Mod: ER

## 2022-06-09 PROCEDURE — 3075F SYST BP GE 130 - 139MM HG: CPT | Mod: CPTII,S$GLB,, | Performed by: NURSE PRACTITIONER

## 2022-06-09 PROCEDURE — 63600175 PHARM REV CODE 636 W HCPCS: Mod: ER | Performed by: FAMILY MEDICINE

## 2022-06-09 PROCEDURE — 1159F PR MEDICATION LIST DOCUMENTED IN MEDICAL RECORD: ICD-10-PCS | Mod: CPTII,S$GLB,, | Performed by: NURSE PRACTITIONER

## 2022-06-09 RX ORDER — ONDANSETRON 2 MG/ML
4 INJECTION INTRAMUSCULAR; INTRAVENOUS
Status: COMPLETED | OUTPATIENT
Start: 2022-06-09 | End: 2022-06-09

## 2022-06-09 RX ORDER — MORPHINE SULFATE 4 MG/ML
4 INJECTION, SOLUTION INTRAMUSCULAR; INTRAVENOUS
Status: COMPLETED | OUTPATIENT
Start: 2022-06-09 | End: 2022-06-09

## 2022-06-09 RX ORDER — CLINDAMYCIN HYDROCHLORIDE 150 MG/1
600 CAPSULE ORAL
Status: COMPLETED | OUTPATIENT
Start: 2022-06-09 | End: 2022-06-09

## 2022-06-09 RX ORDER — DIPHENHYDRAMINE HYDROCHLORIDE 50 MG/ML
25 INJECTION INTRAMUSCULAR; INTRAVENOUS
Status: COMPLETED | OUTPATIENT
Start: 2022-06-09 | End: 2022-06-09

## 2022-06-09 RX ORDER — KETOROLAC TROMETHAMINE 30 MG/ML
15 INJECTION, SOLUTION INTRAMUSCULAR; INTRAVENOUS
Status: COMPLETED | OUTPATIENT
Start: 2022-06-09 | End: 2022-06-09

## 2022-06-09 RX ORDER — CLINDAMYCIN HYDROCHLORIDE 300 MG/1
300 CAPSULE ORAL EVERY 8 HOURS
Qty: 30 CAPSULE | Refills: 0 | Status: SHIPPED | OUTPATIENT
Start: 2022-06-09 | End: 2022-07-20

## 2022-06-09 RX ORDER — HYDROCODONE BITARTRATE AND ACETAMINOPHEN 5; 325 MG/1; MG/1
1 TABLET ORAL EVERY 8 HOURS PRN
Qty: 9 TABLET | Refills: 0 | Status: SHIPPED | OUTPATIENT
Start: 2022-06-09 | End: 2022-07-20

## 2022-06-09 RX ADMIN — DIPHENHYDRAMINE HYDROCHLORIDE 25 MG: 50 INJECTION, SOLUTION INTRAMUSCULAR; INTRAVENOUS at 07:06

## 2022-06-09 RX ADMIN — PIPERACILLIN AND TAZOBACTAM 4.5 G: 4; .5 INJECTION, POWDER, LYOPHILIZED, FOR SOLUTION INTRAVENOUS; PARENTERAL at 06:06

## 2022-06-09 RX ADMIN — MORPHINE SULFATE 4 MG: 4 INJECTION INTRAVENOUS at 06:06

## 2022-06-09 RX ADMIN — KETOROLAC TROMETHAMINE 15 MG: 30 INJECTION, SOLUTION INTRAMUSCULAR at 05:06

## 2022-06-09 RX ADMIN — ONDANSETRON 4 MG: 2 INJECTION INTRAMUSCULAR; INTRAVENOUS at 06:06

## 2022-06-09 RX ADMIN — CLINDAMYCIN HYDROCHLORIDE 600 MG: 150 CAPSULE ORAL at 07:06
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
## 2022-06-09 NOTE — ED NOTES
Pt reassessed following allergic reaction. Redness and rash are gone. Pt denies any complaints. Zosyn added to pt's allergy list.

## 2022-06-09 NOTE — ED TRIAGE NOTES
Reports to ED c c/o L breast pain. States had sx on 6/3 to remove some scarring. Noticed swelling to area when she got up yesterday AM. Denies redness, discharge, or fever

## 2022-06-09 NOTE — PROGRESS NOTES
Post-Op  [facility]  Department of Surgery    REFERRING PROVIDER: No referring provider defined for this encounter. Sissy Zazueta MD  MEDICAL ONCOLOGIST:    N/A  RADIATION ONCOLOGIST:   N/A    DIAGNOSIS:    This is a 47 y.o. female with a radial scar of the left breast.    TREATMENT SUMMARY:  The patient is status post excisional biopsy 6/3/2022.  Final pathology showed radial scar without atypia.     INTERVAL HISTORY:   So Winkler comes in for a post-op check. She presented to the [facility] ED for left breast swelling, tenderness and drainage of the left breast.  Reports she has been doing well since surgery. This morning she woke up with extreme swelling and pain of the left breast. Also reported bloody drainage from the area but unsure if it was coming from incision.  She went to the ED due to the new change. She was started on antibiotic and discharged to follow up in clinic. She denies fever, chills, chest pain or shortness of breath.  Her pain is managed with ibuprofen and pain meds.     MEDICATIONS:  Current Outpatient Medications   Medication Sig Dispense Refill    clindamycin (CLEOCIN) 300 MG capsule Take 1 capsule (300 mg total) by mouth every 8 (eight) hours. 30 capsule 0    HYDROcodone-acetaminophen (NORCO) 5-325 mg per tablet Take 1 tablet by mouth every 8 (eight) hours as needed for Pain. 9 tablet 0    oxyCODONE (ROXICODONE) 5 MG immediate release tablet Take 1 tablet (5 mg total) by mouth every 6 (six) hours as needed for Pain. 10 tablet 0     No current facility-administered medications for this visit.       ALLERGIES:   Review of patient's allergies indicates:   Allergen Reactions    Zosyn [piperacillin-tazobactam] Rash       PHYSICAL EXAMINATION:   General:  This is a well appearing female with appropriate speech, affect and gait.     Breast:  pin-point hole with bloody/serous drainage from medial aspect of areolar incision    Fine Needle Aspiration Procedure Note   Diagnosis:  hemtoma    Location: left breast    Anesthesia: 1% plain lidocaine    Procedure Details   The Procedure, risks and complications have been discussed in detail (including, but not limited to pain, infection, bleeding) with the patient, and the patient has signed consent to have the surgery completed.    The skin was sterilely prepped and draped over the affected area in the usual fashion.  Fine Needle Aspiration was performed with a 16 gauge needle using ultrasound guidance.  Contents of Aspiration: ~ 20 ml of thick, bloody fluid.  Size of mass after cyst aspiration: decreased  Specimens sent to pathology.  EBL: none  Sterile dressing placed.  May place ice pack over affected area during the first 24 hours.    Condition:  Stable    Complications:  none.    IMPRESSION:   Left breast hematoma drained in clinic today.     PLAN:   1. Follow up Tuesday for follow up office visit and breast exam with Dr. Elias  2. Can return to screening mammogram   3. The patient is advised in continued exam of the breast chest wall and to report to this office sooner should she note any areas of abnormality or concern.   4. Reviewed pathology results

## 2022-06-09 NOTE — ED NOTES
Pt having allergic reaction to abx. Red streaks and welping noted  Up arm above infusion site. MD aware. Infusion stopped. No resp distress noted.

## 2022-06-14 ENCOUNTER — OFFICE VISIT (OUTPATIENT)
Dept: SURGERY | Facility: CLINIC | Age: 47
End: 2022-06-14
Payer: COMMERCIAL

## 2022-06-14 VITALS
DIASTOLIC BLOOD PRESSURE: 62 MMHG | HEIGHT: 66 IN | BODY MASS INDEX: 42.43 KG/M2 | SYSTOLIC BLOOD PRESSURE: 116 MMHG | HEART RATE: 67 BPM | WEIGHT: 264 LBS

## 2022-06-14 DIAGNOSIS — Z09 POSTOP CHECK: Primary | ICD-10-CM

## 2022-06-14 DIAGNOSIS — N64.89 RADIAL SCAR OF BREAST: ICD-10-CM

## 2022-06-14 LAB
BACTERIA BLD CULT: NORMAL
BACTERIA BLD CULT: NORMAL

## 2022-06-14 PROCEDURE — 1160F RVW MEDS BY RX/DR IN RCRD: CPT | Mod: CPTII,S$GLB,, | Performed by: SURGERY

## 2022-06-14 PROCEDURE — 3078F PR MOST RECENT DIASTOLIC BLOOD PRESSURE < 80 MM HG: ICD-10-PCS | Mod: CPTII,S$GLB,, | Performed by: SURGERY

## 2022-06-14 PROCEDURE — 3044F HG A1C LEVEL LT 7.0%: CPT | Mod: CPTII,S$GLB,, | Performed by: SURGERY

## 2022-06-14 PROCEDURE — 3074F SYST BP LT 130 MM HG: CPT | Mod: CPTII,S$GLB,, | Performed by: SURGERY

## 2022-06-14 PROCEDURE — 3074F PR MOST RECENT SYSTOLIC BLOOD PRESSURE < 130 MM HG: ICD-10-PCS | Mod: CPTII,S$GLB,, | Performed by: SURGERY

## 2022-06-14 PROCEDURE — 1159F MED LIST DOCD IN RCRD: CPT | Mod: CPTII,S$GLB,, | Performed by: SURGERY

## 2022-06-14 PROCEDURE — 3008F BODY MASS INDEX DOCD: CPT | Mod: CPTII,S$GLB,, | Performed by: SURGERY

## 2022-06-14 PROCEDURE — 99024 PR POST-OP FOLLOW-UP VISIT: ICD-10-PCS | Mod: S$GLB,,, | Performed by: SURGERY

## 2022-06-14 PROCEDURE — 3044F PR MOST RECENT HEMOGLOBIN A1C LEVEL <7.0%: ICD-10-PCS | Mod: CPTII,S$GLB,, | Performed by: SURGERY

## 2022-06-14 PROCEDURE — 3008F PR BODY MASS INDEX (BMI) DOCUMENTED: ICD-10-PCS | Mod: CPTII,S$GLB,, | Performed by: SURGERY

## 2022-06-14 PROCEDURE — 99024 POSTOP FOLLOW-UP VISIT: CPT | Mod: S$GLB,,, | Performed by: SURGERY

## 2022-06-14 PROCEDURE — 99999 PR PBB SHADOW E&M-EST. PATIENT-LVL III: CPT | Mod: PBBFAC,,, | Performed by: SURGERY

## 2022-06-14 PROCEDURE — 99999 PR PBB SHADOW E&M-EST. PATIENT-LVL III: ICD-10-PCS | Mod: PBBFAC,,, | Performed by: SURGERY

## 2022-06-14 PROCEDURE — 3078F DIAST BP <80 MM HG: CPT | Mod: CPTII,S$GLB,, | Performed by: SURGERY

## 2022-06-14 PROCEDURE — 1159F PR MEDICATION LIST DOCUMENTED IN MEDICAL RECORD: ICD-10-PCS | Mod: CPTII,S$GLB,, | Performed by: SURGERY

## 2022-06-14 PROCEDURE — 1160F PR REVIEW ALL MEDS BY PRESCRIBER/CLIN PHARMACIST DOCUMENTED: ICD-10-PCS | Mod: CPTII,S$GLB,, | Performed by: SURGERY

## 2022-07-18 ENCOUNTER — TELEPHONE (OUTPATIENT)
Dept: SURGERY | Facility: CLINIC | Age: 47
End: 2022-07-18
Payer: COMMERCIAL

## 2022-07-18 NOTE — TELEPHONE ENCOUNTER
Called patient in regards to phone message. Patient stated that her breast has been leaking bloody fluid since Friday, July 15th. Offered patient 3 times today to see Grazyna Knox NP but patient declined. Patient elected to keep her appointment with Dr. Elias tomorrow.    ----- Message from Josie Herrera sent at 7/18/2022  8:11 AM CDT -----  Regarding: Call Back Request  PT calling to speak with nurse  please call  [phone] (home)

## 2022-07-19 ENCOUNTER — OFFICE VISIT (OUTPATIENT)
Dept: SURGERY | Facility: CLINIC | Age: 47
End: 2022-07-19
Payer: COMMERCIAL

## 2022-07-19 VITALS
WEIGHT: 264 LBS | BODY MASS INDEX: 42.43 KG/M2 | HEART RATE: 53 BPM | HEIGHT: 66 IN | SYSTOLIC BLOOD PRESSURE: 133 MMHG | DIASTOLIC BLOOD PRESSURE: 91 MMHG

## 2022-07-19 DIAGNOSIS — Z09 FOLLOW UP: ICD-10-CM

## 2022-07-19 DIAGNOSIS — N64.89 RADIAL SCAR OF BREAST: Primary | ICD-10-CM

## 2022-07-19 PROCEDURE — 3044F HG A1C LEVEL LT 7.0%: CPT | Mod: CPTII,S$GLB,, | Performed by: SURGERY

## 2022-07-19 PROCEDURE — 3044F PR MOST RECENT HEMOGLOBIN A1C LEVEL <7.0%: ICD-10-PCS | Mod: CPTII,S$GLB,, | Performed by: SURGERY

## 2022-07-19 PROCEDURE — 99024 PR POST-OP FOLLOW-UP VISIT: ICD-10-PCS | Mod: S$GLB,,, | Performed by: SURGERY

## 2022-07-19 PROCEDURE — 99999 PR PBB SHADOW E&M-EST. PATIENT-LVL III: CPT | Mod: PBBFAC,,, | Performed by: SURGERY

## 2022-07-19 PROCEDURE — 3080F DIAST BP >= 90 MM HG: CPT | Mod: CPTII,S$GLB,, | Performed by: SURGERY

## 2022-07-19 PROCEDURE — 3008F PR BODY MASS INDEX (BMI) DOCUMENTED: ICD-10-PCS | Mod: CPTII,S$GLB,, | Performed by: SURGERY

## 2022-07-19 PROCEDURE — 3008F BODY MASS INDEX DOCD: CPT | Mod: CPTII,S$GLB,, | Performed by: SURGERY

## 2022-07-19 PROCEDURE — 99024 POSTOP FOLLOW-UP VISIT: CPT | Mod: S$GLB,,, | Performed by: SURGERY

## 2022-07-19 PROCEDURE — 1160F RVW MEDS BY RX/DR IN RCRD: CPT | Mod: CPTII,S$GLB,, | Performed by: SURGERY

## 2022-07-19 PROCEDURE — 99999 PR PBB SHADOW E&M-EST. PATIENT-LVL III: ICD-10-PCS | Mod: PBBFAC,,, | Performed by: SURGERY

## 2022-07-19 PROCEDURE — 1159F PR MEDICATION LIST DOCUMENTED IN MEDICAL RECORD: ICD-10-PCS | Mod: CPTII,S$GLB,, | Performed by: SURGERY

## 2022-07-19 PROCEDURE — 1160F PR REVIEW ALL MEDS BY PRESCRIBER/CLIN PHARMACIST DOCUMENTED: ICD-10-PCS | Mod: CPTII,S$GLB,, | Performed by: SURGERY

## 2022-07-19 PROCEDURE — 3075F PR MOST RECENT SYSTOLIC BLOOD PRESS GE 130-139MM HG: ICD-10-PCS | Mod: CPTII,S$GLB,, | Performed by: SURGERY

## 2022-07-19 PROCEDURE — 3075F SYST BP GE 130 - 139MM HG: CPT | Mod: CPTII,S$GLB,, | Performed by: SURGERY

## 2022-07-19 PROCEDURE — 1159F MED LIST DOCD IN RCRD: CPT | Mod: CPTII,S$GLB,, | Performed by: SURGERY

## 2022-07-19 PROCEDURE — 3080F PR MOST RECENT DIASTOLIC BLOOD PRESSURE >= 90 MM HG: ICD-10-PCS | Mod: CPTII,S$GLB,, | Performed by: SURGERY

## 2022-07-19 NOTE — PROGRESS NOTES
[facility]       Post-Op        REFERRING PHYSICIAN:  Sissy Zazueta MD    MEDICAL ONCOLOGIST:    none  RADIATION ONCOLOGIST:   none    DIAGNOSIS:    This is a 47 y.o. female with a radial scar of the left breast    TREATMENT SUMMARY:  The patient is status post left excisional biopsy on 6/3/2022.  Final pathology showed radial scar without atypia.    INTERVAL HISTORY:   So Winkler comes in for a post-op follow-up. Previously had a seroma that was drained 6/9/22 but reported draining bloody fluid from her left breast starting 7/15/22 until 7/17. She endorses max 8/10 pain but is currently not in pain.     She denies fever, chills, chest pain or shortness of breath.  Her pain is not well controlled.      MEDICATIONS:  Current Outpatient Medications   Medication Sig Dispense Refill    clindamycin (CLEOCIN) 300 MG capsule Take 1 capsule (300 mg total) by mouth every 8 (eight) hours. (Patient not taking: Reported on 7/19/2022) 30 capsule 0    HYDROcodone-acetaminophen (NORCO) 5-325 mg per tablet Take 1 tablet by mouth every 8 (eight) hours as needed for Pain. (Patient not taking: Reported on 7/19/2022) 9 tablet 0    oxyCODONE (ROXICODONE) 5 MG immediate release tablet Take 1 tablet (5 mg total) by mouth every 6 (six) hours as needed for Pain. (Patient not taking: Reported on 7/19/2022) 10 tablet 0     No current facility-administered medications for this visit.       ALLERGIES:   Review of patient's allergies indicates:   Allergen Reactions    Zosyn [piperacillin-tazobactam] Rash       PHYSICAL EXAMINATION:   General:  This is a well appearing female with appropriate speech, affect and gait.     Constitutional: She is oriented to person, place, and time. She appears well-developed and well-nourished.   HENT: Normocephalic and atraumatic.   Cardiovascular: Normal rate.    Pulmonary/Chest: Effort normal.   Breast:  Incision is clean, dry, and intact; palpable seroma spanning 12-2 OC    Musculoskeletal: Normal range of motion.   Neurological: She is alert and oriented to person, place, and time.   Skin: Skin is warm and dry.   Psychiatric: She has a normal mood and affect. Her behavior is normal.       Fine Needle Aspiration Procedure Note     Pre-operative Diagnosis: hematoma      Post-operative Diagnosis: same     Location: left breast     Anesthesia: 1% plain lidocaine     Procedure Details   The Procedure, risks and complications have been discussed in detail (including, but not limited to pain, infection, bleeding) with the patient, and the patient has signed consent to have the surgery completed.     The skin was sterilely prepped and draped over the affected area in the usual fashion.  Fine Needle Aspiration was performed with a 18 using ultrasound guidance.  Contents of Aspiration: 30 ml of thick old blood   Size of mass after cyst aspiration: decreased in size  Specimens sent to pathology.  EBL: none  Sterile dressing placed.  May place ice pack over affected area during the first 24 hours.     Condition:  Stable     Complications:  None.      IMPRESSION:   So Denis is a 47 year old female s/p left breast excisional biopsy for radial scar. She has undergone two hematoma aspirations in clinic with relief from both. Discussed with patient may take time for resorption of seroma/hematoma components.    PLAN:   1. Return in 2 weeks for a follow up office visit and breast exam  2. The patient is advised in continued exam of the breast and to report to this office sooner should she note any areas of abnormality or concern.

## 2022-08-02 ENCOUNTER — OFFICE VISIT (OUTPATIENT)
Dept: SURGERY | Facility: CLINIC | Age: 47
End: 2022-08-02
Payer: COMMERCIAL

## 2022-08-02 VITALS — WEIGHT: 264 LBS | BODY MASS INDEX: 42.43 KG/M2 | HEIGHT: 66 IN

## 2022-08-02 DIAGNOSIS — N64.89 RADIAL SCAR OF BREAST: Primary | ICD-10-CM

## 2022-08-02 DIAGNOSIS — Z09 POSTOP CHECK: ICD-10-CM

## 2022-08-02 PROCEDURE — 1159F PR MEDICATION LIST DOCUMENTED IN MEDICAL RECORD: ICD-10-PCS | Mod: CPTII,S$GLB,, | Performed by: SURGERY

## 2022-08-02 PROCEDURE — 3044F PR MOST RECENT HEMOGLOBIN A1C LEVEL <7.0%: ICD-10-PCS | Mod: CPTII,S$GLB,, | Performed by: SURGERY

## 2022-08-02 PROCEDURE — 99999 PR PBB SHADOW E&M-EST. PATIENT-LVL III: CPT | Mod: PBBFAC,,, | Performed by: SURGERY

## 2022-08-02 PROCEDURE — 99024 PR POST-OP FOLLOW-UP VISIT: ICD-10-PCS | Mod: S$GLB,,, | Performed by: SURGERY

## 2022-08-02 PROCEDURE — 99024 POSTOP FOLLOW-UP VISIT: CPT | Mod: S$GLB,,, | Performed by: SURGERY

## 2022-08-02 PROCEDURE — 1159F MED LIST DOCD IN RCRD: CPT | Mod: CPTII,S$GLB,, | Performed by: SURGERY

## 2022-08-02 PROCEDURE — 1160F RVW MEDS BY RX/DR IN RCRD: CPT | Mod: CPTII,S$GLB,, | Performed by: SURGERY

## 2022-08-02 PROCEDURE — 3044F HG A1C LEVEL LT 7.0%: CPT | Mod: CPTII,S$GLB,, | Performed by: SURGERY

## 2022-08-02 PROCEDURE — 3008F PR BODY MASS INDEX (BMI) DOCUMENTED: ICD-10-PCS | Mod: CPTII,S$GLB,, | Performed by: SURGERY

## 2022-08-02 PROCEDURE — 3008F BODY MASS INDEX DOCD: CPT | Mod: CPTII,S$GLB,, | Performed by: SURGERY

## 2022-08-02 PROCEDURE — 99999 PR PBB SHADOW E&M-EST. PATIENT-LVL III: ICD-10-PCS | Mod: PBBFAC,,, | Performed by: SURGERY

## 2022-08-02 PROCEDURE — 1160F PR REVIEW ALL MEDS BY PRESCRIBER/CLIN PHARMACIST DOCUMENTED: ICD-10-PCS | Mod: CPTII,S$GLB,, | Performed by: SURGERY

## 2022-08-02 NOTE — PROGRESS NOTES
[facility]                                                              Post-Op           REFERRING PHYSICIAN:                 Sissy Zazueta MD     MEDICAL ONCOLOGIST:                 none  RADIATION ONCOLOGIST:             none     DIAGNOSIS:    This is a 47 y.o. female with a radial scar of the left breast     TREATMENT SUMMARY:  The patient is status post left excisional biopsy on 6/3/2022.  Final pathology showed radial scar without atypia.     INTERVAL HISTORY:   So Winkler comes in for a post-op follow-up. Previously had a seroma that was drained 6/9/22 but reported draining bloody fluid from her left breast starting 7/15/22 until 7/17. Seroma was drained a second time 7/19/22 in clinic.    Since then, she reports continued drainage from left breast. Reports hematoma is smaller than her last clinic visit. She denies fever, chills, chest pain or shortness of breath.  Her pain is well controlled.       MEDICATIONS:  Current Medications          Current Outpatient Medications   Medication Sig Dispense Refill    clindamycin (CLEOCIN) 300 MG capsule Take 1 capsule (300 mg total) by mouth every 8 (eight) hours. (Patient not taking: Reported on 7/19/2022) 30 capsule 0    HYDROcodone-acetaminophen (NORCO) 5-325 mg per tablet Take 1 tablet by mouth every 8 (eight) hours as needed for Pain. (Patient not taking: Reported on 7/19/2022) 9 tablet 0    oxyCODONE (ROXICODONE) 5 MG immediate release tablet Take 1 tablet (5 mg total) by mouth every 6 (six) hours as needed for Pain. (Patient not taking: Reported on 7/19/2022) 10 tablet 0      No current facility-administered medications for this visit.            ALLERGIES:        Review of patient's allergies indicates:   Allergen Reactions    Zosyn [piperacillin-tazobactam] Rash         PHYSICAL EXAMINATION:   General:  This is a well appearing female with appropriate speech, affect and gait.     Constitutional: She is oriented to person, place, and  time. She appears well-developed and well-nourished.   HENT: Normocephalic and atraumatic.   Cardiovascular: Normal rate.    Pulmonary/Chest: Effort normal.   Breast:  Incision is clean, dry, and intact; palpable seroma/hematoma spanning 12-2 OC   Musculoskeletal: Normal range of motion.   Neurological: She is alert and oriented to person, place, and time.   Skin: Skin is warm and dry.   Psychiatric: She has a normal mood and affect. Her behavior is normal.         Fine Needle Aspiration Procedure Note     Pre-operative Diagnosis: hematoma      Post-operative Diagnosis: same     Location: left breast     Anesthesia: 1% plain lidocaine     Procedure Details   The Procedure, risks and complications have been discussed in detail (including, but not limited to pain, infection, bleeding) with the patient, and the patient has signed consent to have the surgery completed.     The skin was sterilely prepped and draped over the affected area in the usual fashion.  Fine Needle Aspiration was performed with a 16 using ultrasound guidance.  Contents of Aspiration: 5 ml of thick old blood   Size of mass after cyst aspiration: decreased in size  EBL: none  Sterile dressing placed.  May place ice pack over affected area during the first 24 hours.     Condition:  Stable     Complications:  None.        IMPRESSION:   So Denis is a 47 year old female s/p left breast excisional biopsy for radial scar. She has undergone two hematoma aspirations in clinic with relief from both. Discussed with patient may take time for resorption of seroma/hematoma components.     PLAN:   1. Return in 1 month for a follow up office visit and breast exam  2. The patient is advised in continued exam of the breast and to report to this office sooner should she note any areas of abnormality or concern.

## 2022-09-01 ENCOUNTER — OFFICE VISIT (OUTPATIENT)
Dept: SURGERY | Facility: CLINIC | Age: 47
End: 2022-09-01
Payer: COMMERCIAL

## 2022-09-01 VITALS — HEIGHT: 66 IN | BODY MASS INDEX: 42.43 KG/M2 | WEIGHT: 264 LBS

## 2022-09-01 DIAGNOSIS — N64.89 RADIAL SCAR OF BREAST: Primary | ICD-10-CM

## 2022-09-01 PROCEDURE — 99999 PR PBB SHADOW E&M-EST. PATIENT-LVL III: ICD-10-PCS | Mod: PBBFAC,,, | Performed by: SURGERY

## 2022-09-01 PROCEDURE — 99999 PR PBB SHADOW E&M-EST. PATIENT-LVL III: CPT | Mod: PBBFAC,,, | Performed by: SURGERY

## 2022-09-01 PROCEDURE — 3044F HG A1C LEVEL LT 7.0%: CPT | Mod: CPTII,S$GLB,, | Performed by: SURGERY

## 2022-09-01 PROCEDURE — 3008F BODY MASS INDEX DOCD: CPT | Mod: CPTII,S$GLB,, | Performed by: SURGERY

## 2022-09-01 PROCEDURE — 1160F RVW MEDS BY RX/DR IN RCRD: CPT | Mod: CPTII,S$GLB,, | Performed by: SURGERY

## 2022-09-01 PROCEDURE — 3044F PR MOST RECENT HEMOGLOBIN A1C LEVEL <7.0%: ICD-10-PCS | Mod: CPTII,S$GLB,, | Performed by: SURGERY

## 2022-09-01 PROCEDURE — 99024 POSTOP FOLLOW-UP VISIT: CPT | Mod: S$GLB,,, | Performed by: SURGERY

## 2022-09-01 PROCEDURE — 1159F MED LIST DOCD IN RCRD: CPT | Mod: CPTII,S$GLB,, | Performed by: SURGERY

## 2022-09-01 PROCEDURE — 1159F PR MEDICATION LIST DOCUMENTED IN MEDICAL RECORD: ICD-10-PCS | Mod: CPTII,S$GLB,, | Performed by: SURGERY

## 2022-09-01 PROCEDURE — 99024 PR POST-OP FOLLOW-UP VISIT: ICD-10-PCS | Mod: S$GLB,,, | Performed by: SURGERY

## 2022-09-01 PROCEDURE — 3008F PR BODY MASS INDEX (BMI) DOCUMENTED: ICD-10-PCS | Mod: CPTII,S$GLB,, | Performed by: SURGERY

## 2022-09-01 PROCEDURE — 1160F PR REVIEW ALL MEDS BY PRESCRIBER/CLIN PHARMACIST DOCUMENTED: ICD-10-PCS | Mod: CPTII,S$GLB,, | Performed by: SURGERY

## 2022-09-01 NOTE — PROGRESS NOTES
[facility]  Department of Surgery      REFERRING PROVIDER: No referring provider defined for this encounter.    Chief Complaint: Follow-up (Patient presents today for a 1 month follow up appointment for her left breast. No current pain today. )       DIAGNOSIS:    This is a 47 y.o. female with a radial scar of the left breast complicated by late hematoma     TREATMENT SUMMARY:  The patient is status post left excisional biopsy on 6/3/2022.  Final pathology showed radial scar without atypia.     INTERVAL HISTORY 9/1/22:     8/3/22: So Winkler comes in for a post-op follow-up. Previously had a seroma that was drained 6/9/22 but reported draining bloody fluid from her left breast starting 7/15/22 until 7/17. Seroma was drained a second time 7/19/22 in clinic.  Since then, she reports continued drainage from left breast. Reports hematoma is smaller than her last clinic visit. She denies fever, chills, chest pain or shortness of breath.  Her pain is well controlled.      Patient returns for 1 month follow up. Patient developed a post operative hematoma of her excisional biopsy site that has been drained three times previously since her surgery with amount of bloody aspirate decreasing steadily with each subsequent drainage      MEDICATIONS:  Current Medications          Current Outpatient Medications   Medication Sig Dispense Refill    clindamycin (CLEOCIN) 300 MG capsule Take 1 capsule (300 mg total) by mouth every 8 (eight) hours. (Patient not taking: Reported on 7/19/2022) 30 capsule 0    HYDROcodone-acetaminophen (NORCO) 5-325 mg per tablet Take 1 tablet by mouth every 8 (eight) hours as needed for Pain. (Patient not taking: Reported on 7/19/2022) 9 tablet 0    oxyCODONE (ROXICODONE) 5 MG immediate release tablet Take 1 tablet (5 mg total) by mouth every 6 (six) hours as needed for Pain. (Patient not taking: Reported on 7/19/2022) 10 tablet 0      No current facility-administered medications for  this visit.            ALLERGIES:        Review of patient's allergies indicates:   Allergen Reactions    Zosyn [piperacillin-tazobactam] Rash         PHYSICAL EXAMINATION:   General:  This is a well appearing female with appropriate speech, affect and gait.     Constitutional: She is oriented to person, place, and time. She appears well-developed and well-nourished.   HENT: Normocephalic and atraumatic.   Cardiovascular: Normal rate.    Pulmonary/Chest: Effort normal.   Breast:  Incision is clean, dry, and intact; palpable seroma/hematoma spanning 12-2 OC   Musculoskeletal: Normal range of motion.   Neurological: She is alert and oriented to person, place, and time.   Skin: Skin is warm and dry.   Psychiatric: She has a normal mood and affect. Her behavior is normal.         Fine Needle Aspiration Procedure Note     Pre-operative Diagnosis: hematoma      Post-operative Diagnosis: same     Location: left breast     Anesthesia: 1% plain lidocaine     Procedure Details   The Procedure, risks and complications have been discussed in detail (including, but not limited to pain, infection, bleeding) with the patient, and the patient has signed consent to have the surgery completed.     The skin was sterilely prepped and draped over the affected area in the usual fashion.  Fine Needle Aspiration was performed with a 16 using ultrasound guidance.    Contents of Aspiration: 7cc of thick old blood  Size of mass after cyst aspiration: decreased in size  EBL: none  Sterile dressing placed.  May place ice pack over affected area during the first 24 hours.     Condition:  Stable     Complications:  None.        IMPRESSION:   So Denis is a 47 year old female s/p left breast excisional biopsy for radial scar. She has undergone two hematoma aspirations in clinic with relief from both. Discussed with patient may take time for resorption of seroma/hematoma components.     PLAN:   1. Return in 1 month for a follow up office  visit and breast exam  2. The patient is advised in continued exam of the breast and to report to this office sooner should she note any areas of abnormality or concern.        Total time spent with the patient: 15.  10 minutes of face to face consultation and 5 minutes of chart review and coordination of care.

## 2022-10-06 ENCOUNTER — OFFICE VISIT (OUTPATIENT)
Dept: SURGERY | Facility: CLINIC | Age: 47
End: 2022-10-06
Payer: COMMERCIAL

## 2022-10-06 VITALS
SYSTOLIC BLOOD PRESSURE: 111 MMHG | BODY MASS INDEX: 39.53 KG/M2 | WEIGHT: 246 LBS | DIASTOLIC BLOOD PRESSURE: 62 MMHG | HEART RATE: 59 BPM | HEIGHT: 66 IN

## 2022-10-06 DIAGNOSIS — N64.89 RADIAL SCAR OF BREAST: Primary | ICD-10-CM

## 2022-10-06 PROCEDURE — 3008F PR BODY MASS INDEX (BMI) DOCUMENTED: ICD-10-PCS | Mod: CPTII,S$GLB,, | Performed by: SURGERY

## 2022-10-06 PROCEDURE — 99212 PR OFFICE/OUTPT VISIT, EST, LEVL II, 10-19 MIN: ICD-10-PCS | Mod: S$GLB,,, | Performed by: SURGERY

## 2022-10-06 PROCEDURE — 1159F PR MEDICATION LIST DOCUMENTED IN MEDICAL RECORD: ICD-10-PCS | Mod: CPTII,S$GLB,, | Performed by: SURGERY

## 2022-10-06 PROCEDURE — 3074F PR MOST RECENT SYSTOLIC BLOOD PRESSURE < 130 MM HG: ICD-10-PCS | Mod: CPTII,S$GLB,, | Performed by: SURGERY

## 2022-10-06 PROCEDURE — 3078F DIAST BP <80 MM HG: CPT | Mod: CPTII,S$GLB,, | Performed by: SURGERY

## 2022-10-06 PROCEDURE — 1160F RVW MEDS BY RX/DR IN RCRD: CPT | Mod: CPTII,S$GLB,, | Performed by: SURGERY

## 2022-10-06 PROCEDURE — 3008F BODY MASS INDEX DOCD: CPT | Mod: CPTII,S$GLB,, | Performed by: SURGERY

## 2022-10-06 PROCEDURE — 1159F MED LIST DOCD IN RCRD: CPT | Mod: CPTII,S$GLB,, | Performed by: SURGERY

## 2022-10-06 PROCEDURE — 99999 PR PBB SHADOW E&M-EST. PATIENT-LVL III: CPT | Mod: PBBFAC,,, | Performed by: SURGERY

## 2022-10-06 PROCEDURE — 3078F PR MOST RECENT DIASTOLIC BLOOD PRESSURE < 80 MM HG: ICD-10-PCS | Mod: CPTII,S$GLB,, | Performed by: SURGERY

## 2022-10-06 PROCEDURE — 3074F SYST BP LT 130 MM HG: CPT | Mod: CPTII,S$GLB,, | Performed by: SURGERY

## 2022-10-06 PROCEDURE — 99212 OFFICE O/P EST SF 10 MIN: CPT | Mod: S$GLB,,, | Performed by: SURGERY

## 2022-10-06 PROCEDURE — 1160F PR REVIEW ALL MEDS BY PRESCRIBER/CLIN PHARMACIST DOCUMENTED: ICD-10-PCS | Mod: CPTII,S$GLB,, | Performed by: SURGERY

## 2022-10-06 PROCEDURE — 99999 PR PBB SHADOW E&M-EST. PATIENT-LVL III: ICD-10-PCS | Mod: PBBFAC,,, | Performed by: SURGERY

## 2022-10-06 NOTE — PROGRESS NOTES
[facility]  Department of Surgery      REFERRING PROVIDER: No referring provider defined for this encounter.    Chief Complaint: Follow-up (1 mo f/u)         DIAGNOSIS:    This is a 47 y.o. female with a radial scar of the left breast complicated by late hematoma     TREATMENT SUMMARY:  The patient is status post left excisional biopsy on 6/3/2022.  Final pathology showed radial scar without atypia.     INTERVAL HISTORY 9/1/22:     10/6/22: So Winkler comes in for a post-op follow-up. Previously had a seroma that was drained 6/9/22 but reported draining bloody fluid from her left breast starting 7/15/22 until 7/17. Seroma was drained two more times, 7/19/22  and 9/1/22 in clinic with subsequent decrease in aspirate.  Patient reports mild tingling at today's visit, no pain. Reports hematoma is smaller than her last clinic visit. She denies fever, chills, chest pain or shortness of breath.  Her pain is well controlled.           MEDICATIONS:  Current Medications          Current Outpatient Medications   Medication Sig Dispense Refill    clindamycin (CLEOCIN) 300 MG capsule Take 1 capsule (300 mg total) by mouth every 8 (eight) hours. (Patient not taking: Reported on 7/19/2022) 30 capsule 0    HYDROcodone-acetaminophen (NORCO) 5-325 mg per tablet Take 1 tablet by mouth every 8 (eight) hours as needed for Pain. (Patient not taking: Reported on 7/19/2022) 9 tablet 0    oxyCODONE (ROXICODONE) 5 MG immediate release tablet Take 1 tablet (5 mg total) by mouth every 6 (six) hours as needed for Pain. (Patient not taking: Reported on 7/19/2022) 10 tablet 0      No current facility-administered medications for this visit.            ALLERGIES:        Review of patient's allergies indicates:   Allergen Reactions    Zosyn [piperacillin-tazobactam] Rash         Physical Exam   Vitals reviewed.  Constitutional: She is oriented to person, place, and time.   HENT:   Head: Normocephalic and atraumatic.   Nose:  Nose normal.   Eyes: Pupils are equal, round, and reactive to light. Right eye exhibits no discharge. Left eye exhibits no discharge.   Pulmonary/Chest: Effort normal and breath sounds normal. No stridor. No respiratory distress. She exhibits no mass, no tenderness and no edema. Right breast exhibits no inverted nipple, no mass, no nipple discharge, no skin change and no tenderness. Left breast exhibits no inverted nipple, no mass, no nipple discharge, no skin change and no tenderness. No breast swelling or bleeding. Breasts are symmetrical.       Abdominal: Normal appearance.   Genitourinary: No breast swelling or bleeding.   Neurological: She is alert and oriented to person, place, and time.   Skin: Skin is warm and dry.     Psychiatric: Her behavior is normal. Mood, judgment and thought content normal.                  IMPRESSION:   So Denis is a 47 year old female s/p left breast excisional biopsy for radial scar. She has undergone two hematoma aspirations in clinic with relief from both. Discussed with patient may take time for resorption of seroma/hematoma components.     PLAN:   1. Return in 3 months for a follow up office visit and breast exam  2. The patient is advised in continued exam of the breast and to report to this office sooner should she note any areas of abnormality or concern.

## 2022-11-21 ENCOUNTER — PATIENT MESSAGE (OUTPATIENT)
Dept: ADMINISTRATIVE | Facility: HOSPITAL | Age: 47
End: 2022-11-21
Payer: COMMERCIAL

## 2023-01-26 ENCOUNTER — OFFICE VISIT (OUTPATIENT)
Dept: SURGERY | Facility: CLINIC | Age: 48
End: 2023-01-26
Payer: COMMERCIAL

## 2023-01-26 VITALS
SYSTOLIC BLOOD PRESSURE: 127 MMHG | WEIGHT: 264 LBS | BODY MASS INDEX: 42.43 KG/M2 | DIASTOLIC BLOOD PRESSURE: 81 MMHG | HEIGHT: 66 IN | HEART RATE: 60 BPM

## 2023-01-26 DIAGNOSIS — N64.89 RADIAL SCAR OF BREAST: ICD-10-CM

## 2023-01-26 DIAGNOSIS — Z12.31 SCREENING MAMMOGRAM, ENCOUNTER FOR: Primary | ICD-10-CM

## 2023-01-26 PROCEDURE — 99212 PR OFFICE/OUTPT VISIT, EST, LEVL II, 10-19 MIN: ICD-10-PCS | Mod: S$GLB,,, | Performed by: SURGERY

## 2023-01-26 PROCEDURE — 99212 OFFICE O/P EST SF 10 MIN: CPT | Mod: S$GLB,,, | Performed by: SURGERY

## 2023-01-26 PROCEDURE — 1159F PR MEDICATION LIST DOCUMENTED IN MEDICAL RECORD: ICD-10-PCS | Mod: CPTII,S$GLB,, | Performed by: SURGERY

## 2023-01-26 PROCEDURE — 3008F BODY MASS INDEX DOCD: CPT | Mod: CPTII,S$GLB,, | Performed by: SURGERY

## 2023-01-26 PROCEDURE — 3074F PR MOST RECENT SYSTOLIC BLOOD PRESSURE < 130 MM HG: ICD-10-PCS | Mod: CPTII,S$GLB,, | Performed by: SURGERY

## 2023-01-26 PROCEDURE — 3008F PR BODY MASS INDEX (BMI) DOCUMENTED: ICD-10-PCS | Mod: CPTII,S$GLB,, | Performed by: SURGERY

## 2023-01-26 PROCEDURE — 3074F SYST BP LT 130 MM HG: CPT | Mod: CPTII,S$GLB,, | Performed by: SURGERY

## 2023-01-26 PROCEDURE — 3079F PR MOST RECENT DIASTOLIC BLOOD PRESSURE 80-89 MM HG: ICD-10-PCS | Mod: CPTII,S$GLB,, | Performed by: SURGERY

## 2023-01-26 PROCEDURE — 3079F DIAST BP 80-89 MM HG: CPT | Mod: CPTII,S$GLB,, | Performed by: SURGERY

## 2023-01-26 PROCEDURE — 99999 PR PBB SHADOW E&M-EST. PATIENT-LVL III: ICD-10-PCS | Mod: PBBFAC,,, | Performed by: SURGERY

## 2023-01-26 PROCEDURE — 1159F MED LIST DOCD IN RCRD: CPT | Mod: CPTII,S$GLB,, | Performed by: SURGERY

## 2023-01-26 PROCEDURE — 99999 PR PBB SHADOW E&M-EST. PATIENT-LVL III: CPT | Mod: PBBFAC,,, | Performed by: SURGERY

## 2023-01-31 NOTE — PROGRESS NOTES
"[facility]  Department of Surgery    REFERRING PROVIDER: Sissy Zazueta MD  CHIEF COMPLAINT:   Chief Complaint   Patient presents with    Follow-up     3 mon f/u       DIAGNOSIS:   This is a 47 y.o. female with a history of radial scar of the left breast s/p excisional biopsy on 6/3/22 complicated by late hematoma.     HISTORY OF PRESENT ILLNESS:   So Winkler is a 47 y.o. female comes in for 3 month follow up evaluation of her left breast hematoma. Patient states the palpable abnormality is smaller and has softened since she was last seen. She denies change in her breast self-exam specifically denying new masses, skin or nipple changes, or nipple discharge.The patient denies constitutional symptoms of night sweats, chills, weight loss, new headaches, visual changes, new back or bony pain, chest pain, or shortness of breath.        Review of Systems: See HPI/Interval History for other systems reviewed.     IMAGING:   none     MEDICATIONS/ALLERGIES:     No current outpatient medications on file.     No current facility-administered medications for this visit.      Review of patient's allergies indicates:   Allergen Reactions    Zosyn [piperacillin-tazobactam] Rash       PHYSICAL EXAM:   /81 (BP Location: Right arm, Patient Position: Sitting, BP Method: Large (Automatic))   Pulse 60   Ht 5' 6" (1.676 m)   Wt 119.7 kg (264 lb)   LMP  (LMP Unknown)   BMI 42.61 kg/m²     Physical Exam   Vitals reviewed.  Constitutional: She is oriented to person, place, and time.   HENT:   Head: Normocephalic and atraumatic.   Nose: Nose normal.   Eyes: Pupils are equal, round, and reactive to light. Right eye exhibits no discharge. Left eye exhibits no discharge.   Pulmonary/Chest: Effort normal and breath sounds normal. No stridor. No respiratory distress. She exhibits no mass, no tenderness and no edema. Right breast exhibits no inverted nipple, no mass, no nipple discharge, no skin change and no tenderness. Left " breast exhibits no inverted nipple, no mass, no nipple discharge, no skin change and no tenderness. No breast swelling or bleeding. Breasts are symmetrical.       Abdominal: Normal appearance.   Genitourinary: No breast swelling or bleeding.   Neurological: She is alert and oriented to person, place, and time.   Skin: Skin is warm and dry.     Psychiatric: Her behavior is normal. Mood, judgment and thought content normal.     ASSESSMENT:   This is a 47 y.o. female without evidence of recurrence by exam, history or imaging.       PLAN:   1. Continued improvement noted on exam.   2. Continue monthly self breast exams and call the clinic with any changes or problems.  3. Mammogram in June 2023.  4. Return to clinic in June with mammogram.    The patient is in agreement with the plan. Questions were encouraged and answered to patient's satisfaction. So will call our office with any questions or concerns.     15 minutes were spent on this encounter, 10 of which was face to face counseling and 5 minutes were spent on chart review and coordination of care.

## 2023-02-10 ENCOUNTER — PATIENT OUTREACH (OUTPATIENT)
Dept: ADMINISTRATIVE | Facility: HOSPITAL | Age: 48
End: 2023-02-10
Payer: COMMERCIAL

## 2023-02-10 NOTE — PROGRESS NOTES
02/10/2023 Care Everywhere updates requested and reviewed.  Immunizations reconciled. Media reports reviewed.  Duplicate HM overrides and  orders removed.  Overdue HM topic chart audit and/or requested.  Overdue lab testing linked to upcoming lab appointments if applies.  Lab alice, and [facility] reviewed   Portal outreached regarding Health maintenance     Health Maintenance Due   Topic Date Due    COVID-19 Vaccine (1) Never done    Colorectal Cancer Screening  Never done    TETANUS VACCINE  2022    Influenza Vaccine (1) Never done

## 2023-02-13 ENCOUNTER — HOSPITAL ENCOUNTER (EMERGENCY)
Facility: HOSPITAL | Age: 48
Discharge: HOME | End: 2023-02-13
Attending: EMERGENCY MEDICINE
Payer: COMMERCIAL

## 2023-02-13 VITALS
DIASTOLIC BLOOD PRESSURE: 82 MMHG | RESPIRATION RATE: 16 BRPM | BODY MASS INDEX: 42.59 KG/M2 | HEIGHT: 66 IN | OXYGEN SATURATION: 100 % | HEART RATE: 60 BPM | SYSTOLIC BLOOD PRESSURE: 142 MMHG | WEIGHT: 265 LBS | TEMPERATURE: 98 F

## 2023-02-13 DIAGNOSIS — R10.9 FLANK PAIN: ICD-10-CM

## 2023-02-13 DIAGNOSIS — T19.2XXA VAGINAL FOREIGN BODY, INITIAL ENCOUNTER: Primary | ICD-10-CM

## 2023-02-13 LAB
B-HCG UR QL: NEGATIVE
BACTERIA GENITAL QL WET PREP: ABNORMAL
BILIRUB UR QL STRIP: NEGATIVE
CLARITY UR REFRACT.AUTO: CLEAR
CLUE CELLS VAG QL WET PREP: ABNORMAL
COLOR UR AUTO: YELLOW
CTP QC/QA: YES
FILAMENT FUNGI VAG WET PREP-#/AREA: ABNORMAL
GLUCOSE UR QL STRIP: NEGATIVE
HCV AB SERPL QL IA: NORMAL
HGB UR QL STRIP: NEGATIVE
HIV 1+2 AB+HIV1 P24 AG SERPL QL IA: NORMAL
KETONES UR QL STRIP: NEGATIVE
LEUKOCYTE ESTERASE UR QL STRIP: NEGATIVE
NITRITE UR QL STRIP: NEGATIVE
PH UR STRIP: 7 [PH] (ref 5–8)
PROT UR QL STRIP: NEGATIVE
SP GR UR STRIP: 1.02 (ref 1–1.03)
SPECIMEN SOURCE: ABNORMAL
T VAGINALIS GENITAL QL WET PREP: ABNORMAL
URN SPEC COLLECT METH UR: NORMAL
WBC #/AREA VAG WET PREP: ABNORMAL
YEAST GENITAL QL WET PREP: ABNORMAL

## 2023-02-13 PROCEDURE — 99284 EMERGENCY DEPT VISIT MOD MDM: CPT

## 2023-02-13 PROCEDURE — 99283 PR EMERGENCY DEPT VISIT,LEVEL III: ICD-10-PCS | Mod: ,,, | Performed by: PHYSICIAN ASSISTANT

## 2023-02-13 PROCEDURE — 80047 BASIC METABLC PNL IONIZED CA: CPT

## 2023-02-13 PROCEDURE — 87389 HIV-1 AG W/HIV-1&-2 AB AG IA: CPT | Performed by: PHYSICIAN ASSISTANT

## 2023-02-13 PROCEDURE — 86592 SYPHILIS TEST NON-TREP QUAL: CPT | Performed by: PHYSICIAN ASSISTANT

## 2023-02-13 PROCEDURE — 87591 N.GONORRHOEAE DNA AMP PROB: CPT | Performed by: PHYSICIAN ASSISTANT

## 2023-02-13 PROCEDURE — 86803 HEPATITIS C AB TEST: CPT | Performed by: PHYSICIAN ASSISTANT

## 2023-02-13 PROCEDURE — 87210 SMEAR WET MOUNT SALINE/INK: CPT | Performed by: PHYSICIAN ASSISTANT

## 2023-02-13 PROCEDURE — 81003 URINALYSIS AUTO W/O SCOPE: CPT | Performed by: PHYSICIAN ASSISTANT

## 2023-02-13 PROCEDURE — 81025 URINE PREGNANCY TEST: CPT | Performed by: PHYSICIAN ASSISTANT

## 2023-02-13 PROCEDURE — 87491 CHLMYD TRACH DNA AMP PROBE: CPT | Performed by: PHYSICIAN ASSISTANT

## 2023-02-13 PROCEDURE — 99283 EMERGENCY DEPT VISIT LOW MDM: CPT | Mod: ,,, | Performed by: PHYSICIAN ASSISTANT

## 2023-02-13 RX ORDER — NAPROXEN 500 MG/1
500 TABLET ORAL
Status: DISCONTINUED | OUTPATIENT
Start: 2023-02-13 | End: 2023-02-13 | Stop reason: HOSPADM

## 2023-02-13 RX ORDER — KETOROLAC TROMETHAMINE 30 MG/ML
10 INJECTION, SOLUTION INTRAMUSCULAR; INTRAVENOUS
Status: DISCONTINUED | OUTPATIENT
Start: 2023-02-13 | End: 2023-02-13

## 2023-02-13 NOTE — ED TRIAGE NOTES
Pt reports having L sided flank pain for the past week. Pt states after having sex Friday she thinks the condom is currently stuck in her vagina. Pt reports having a foul to urine along with boil in vagina. Pt denies past medical hx. Pt AAOx4.
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
## 2023-02-13 NOTE — ED NOTES
i-STAT CHEM9+  Na - 140  K - 4.1  Cl - 107  iCa - 1.06  TCO2 - 25  Glu - 94  BUN - 21  Crea - 0.6  Hct -  42

## 2023-02-14 LAB
BUN SERPL-MCNC: 21 MG/DL (ref 6–30)
BUN SERPL-MCNC: 21 MG/DL (ref 6–30)
C TRACH DNA SPEC QL NAA+PROBE: NOT DETECTED
CHLORIDE SERPL-SCNC: 107 MMOL/L (ref 95–110)
CHLORIDE SERPL-SCNC: 107 MMOL/L (ref 95–110)
CREAT SERPL-MCNC: 0.6 MG/DL (ref 0.5–1.4)
CREAT SERPL-MCNC: 0.6 MG/DL (ref 0.5–1.4)
GLUCOSE SERPL-MCNC: 94 MG/DL (ref 70–110)
GLUCOSE SERPL-MCNC: 94 MG/DL (ref 70–110)
HCT VFR BLD CALC: 42 %PCV (ref 36–54)
HCT VFR BLD CALC: 42 %PCV (ref 36–54)
N GONORRHOEA DNA SPEC QL NAA+PROBE: NOT DETECTED
POC IONIZED CALCIUM: 1.06 MMOL/L (ref 1.06–1.42)
POC IONIZED CALCIUM: 1.06 MMOL/L (ref 1.06–1.42)
POC TCO2 (MEASURED): 25 MMOL/L (ref 23–29)
POC TCO2 (MEASURED): 25 MMOL/L (ref 23–29)
POTASSIUM BLD-SCNC: 4.1 MMOL/L (ref 3.5–5.1)
POTASSIUM BLD-SCNC: 4.1 MMOL/L (ref 3.5–5.1)
RPR SER QL: NORMAL
SAMPLE: NORMAL
SAMPLE: NORMAL
SODIUM BLD-SCNC: 140 MMOL/L (ref 136–145)
SODIUM BLD-SCNC: 140 MMOL/L (ref 136–145)

## 2023-02-14 NOTE — DISCHARGE INSTRUCTIONS
Diagnosis: Vaginal foreign body, flank pain    You had a condom removed from your vagina.  Your urine does not show any signs of infection.  We will call you if you need to get antibiotic treatment for a vaginal infection.  We sent testing for syphilis, HIV, gonorrhea and chlamydia, we will call you if these are positive.    Please schedule a follow-up appointment with her gynecologist with start have any worsening symptoms, please return to the emergency department.

## 2023-02-20 ENCOUNTER — TELEPHONE (OUTPATIENT)
Dept: FAMILY MEDICINE | Facility: CLINIC | Age: 48
End: 2023-02-20
Payer: COMMERCIAL

## 2023-02-20 ENCOUNTER — PATIENT MESSAGE (OUTPATIENT)
Dept: FAMILY MEDICINE | Facility: CLINIC | Age: 48
End: 2023-02-20
Payer: COMMERCIAL

## 2023-03-29 ENCOUNTER — TELEPHONE (OUTPATIENT)
Dept: ADMINISTRATIVE | Facility: OTHER | Age: 48
End: 2023-03-29
Payer: COMMERCIAL

## 2023-03-29 ENCOUNTER — OFFICE VISIT (OUTPATIENT)
Dept: FAMILY MEDICINE | Facility: CLINIC | Age: 48
End: 2023-03-29
Payer: COMMERCIAL

## 2023-03-29 VITALS
WEIGHT: 270.5 LBS | OXYGEN SATURATION: 99 % | BODY MASS INDEX: 43.47 KG/M2 | HEIGHT: 66 IN | DIASTOLIC BLOOD PRESSURE: 84 MMHG | SYSTOLIC BLOOD PRESSURE: 138 MMHG | HEART RATE: 56 BPM

## 2023-03-29 DIAGNOSIS — Z83.3 FAMILY HISTORY OF DIABETES MELLITUS: ICD-10-CM

## 2023-03-29 DIAGNOSIS — Z00.01 ENCOUNTER FOR GENERAL ADULT MEDICAL EXAMINATION WITH ABNORMAL FINDINGS: Primary | ICD-10-CM

## 2023-03-29 DIAGNOSIS — R03.0 ELEVATED BLOOD PRESSURE READING IN OFFICE WITHOUT DIAGNOSIS OF HYPERTENSION: ICD-10-CM

## 2023-03-29 DIAGNOSIS — E66.01 CLASS 3 SEVERE OBESITY DUE TO EXCESS CALORIES WITH SERIOUS COMORBIDITY AND BODY MASS INDEX (BMI) OF 40.0 TO 44.9 IN ADULT: ICD-10-CM

## 2023-03-29 DIAGNOSIS — A63.0 GENITAL WARTS DUE TO HPV (HUMAN PAPILLOMAVIRUS): ICD-10-CM

## 2023-03-29 DIAGNOSIS — F43.21 ADJUSTMENT DISORDER WITH DEPRESSED MOOD: ICD-10-CM

## 2023-03-29 DIAGNOSIS — Z63.6 CAREGIVER STRESS: ICD-10-CM

## 2023-03-29 DIAGNOSIS — Z11.3 ROUTINE SCREENING FOR STI (SEXUALLY TRANSMITTED INFECTION): ICD-10-CM

## 2023-03-29 DIAGNOSIS — R63.8 UNABLE TO LOSE WEIGHT: ICD-10-CM

## 2023-03-29 DIAGNOSIS — Z12.11 COLON CANCER SCREENING: ICD-10-CM

## 2023-03-29 DIAGNOSIS — R73.03 PREDIABETES: ICD-10-CM

## 2023-03-29 DIAGNOSIS — R79.89 ELEVATED LFTS: ICD-10-CM

## 2023-03-29 DIAGNOSIS — Z23 IMMUNIZATION DUE: ICD-10-CM

## 2023-03-29 DIAGNOSIS — N64.89 RADIAL SCAR OF BREAST: ICD-10-CM

## 2023-03-29 PROCEDURE — 1160F PR REVIEW ALL MEDS BY PRESCRIBER/CLIN PHARMACIST DOCUMENTED: ICD-10-PCS | Mod: CPTII,S$GLB,, | Performed by: FAMILY MEDICINE

## 2023-03-29 PROCEDURE — 3075F SYST BP GE 130 - 139MM HG: CPT | Mod: CPTII,S$GLB,, | Performed by: FAMILY MEDICINE

## 2023-03-29 PROCEDURE — 3075F PR MOST RECENT SYSTOLIC BLOOD PRESS GE 130-139MM HG: ICD-10-PCS | Mod: CPTII,S$GLB,, | Performed by: FAMILY MEDICINE

## 2023-03-29 PROCEDURE — 90471 IMMUNIZATION ADMIN: CPT | Mod: S$GLB,,, | Performed by: FAMILY MEDICINE

## 2023-03-29 PROCEDURE — 3044F HG A1C LEVEL LT 7.0%: CPT | Mod: CPTII,S$GLB,, | Performed by: FAMILY MEDICINE

## 2023-03-29 PROCEDURE — 90471 TDAP VACCINE GREATER THAN OR EQUAL TO 7YO IM: ICD-10-PCS | Mod: S$GLB,,, | Performed by: FAMILY MEDICINE

## 2023-03-29 PROCEDURE — 1159F PR MEDICATION LIST DOCUMENTED IN MEDICAL RECORD: ICD-10-PCS | Mod: CPTII,S$GLB,, | Performed by: FAMILY MEDICINE

## 2023-03-29 PROCEDURE — 3044F PR MOST RECENT HEMOGLOBIN A1C LEVEL <7.0%: ICD-10-PCS | Mod: CPTII,S$GLB,, | Performed by: FAMILY MEDICINE

## 2023-03-29 PROCEDURE — 90715 TDAP VACCINE GREATER THAN OR EQUAL TO 7YO IM: ICD-10-PCS | Mod: S$GLB,,, | Performed by: FAMILY MEDICINE

## 2023-03-29 PROCEDURE — 3008F BODY MASS INDEX DOCD: CPT | Mod: CPTII,S$GLB,, | Performed by: FAMILY MEDICINE

## 2023-03-29 PROCEDURE — 3079F DIAST BP 80-89 MM HG: CPT | Mod: CPTII,S$GLB,, | Performed by: FAMILY MEDICINE

## 2023-03-29 PROCEDURE — 1159F MED LIST DOCD IN RCRD: CPT | Mod: CPTII,S$GLB,, | Performed by: FAMILY MEDICINE

## 2023-03-29 PROCEDURE — 1160F RVW MEDS BY RX/DR IN RCRD: CPT | Mod: CPTII,S$GLB,, | Performed by: FAMILY MEDICINE

## 2023-03-29 PROCEDURE — 99999 PR PBB SHADOW E&M-EST. PATIENT-LVL IV: CPT | Mod: PBBFAC,,, | Performed by: FAMILY MEDICINE

## 2023-03-29 PROCEDURE — 99999 PR PBB SHADOW E&M-EST. PATIENT-LVL IV: ICD-10-PCS | Mod: PBBFAC,,, | Performed by: FAMILY MEDICINE

## 2023-03-29 PROCEDURE — 90715 TDAP VACCINE 7 YRS/> IM: CPT | Mod: S$GLB,,, | Performed by: FAMILY MEDICINE

## 2023-03-29 PROCEDURE — 3079F PR MOST RECENT DIASTOLIC BLOOD PRESSURE 80-89 MM HG: ICD-10-PCS | Mod: CPTII,S$GLB,, | Performed by: FAMILY MEDICINE

## 2023-03-29 PROCEDURE — 3008F PR BODY MASS INDEX (BMI) DOCUMENTED: ICD-10-PCS | Mod: CPTII,S$GLB,, | Performed by: FAMILY MEDICINE

## 2023-03-29 PROCEDURE — 99396 PR PREVENTIVE VISIT,EST,40-64: ICD-10-PCS | Mod: 25,S$GLB,, | Performed by: FAMILY MEDICINE

## 2023-03-29 PROCEDURE — 99396 PREV VISIT EST AGE 40-64: CPT | Mod: 25,S$GLB,, | Performed by: FAMILY MEDICINE

## 2023-03-29 RX ORDER — SEMAGLUTIDE 1.34 MG/ML
INJECTION, SOLUTION SUBCUTANEOUS
Qty: 3 ML | Refills: 0 | Status: SHIPPED | OUTPATIENT
Start: 2023-03-29 | End: 2023-03-30 | Stop reason: SDUPTHER

## 2023-03-29 RX ORDER — IMIQUIMOD 12.5 MG/.25G
CREAM TOPICAL
Qty: 24 PACKET | Refills: 1 | Status: SHIPPED | OUTPATIENT
Start: 2023-03-29 | End: 2023-09-19

## 2023-03-29 SDOH — SOCIAL DETERMINANTS OF HEALTH (SDOH): DEPENDENT RELATIVE NEEDING CARE AT HOME: Z63.6

## 2023-03-29 NOTE — PROGRESS NOTES
Subjective:       Patient ID: So Winkler is a 47 y.o. female.    Chief Complaint: Annual Exam    Patient Active Problem List   Diagnosis    Class 3 severe obesity due to excess calories with serious comorbidity and body mass index (BMI) of 40.0 to 44.9 in adult    Prediabetes    Family history of diabetes mellitus    Radial scar of breast      HPI  48 yo female presents today for annual exam.     Review of Systems   Constitutional:  Positive for unexpected weight change. Negative for activity change.   HENT:  Negative for hearing loss, rhinorrhea and trouble swallowing.    Eyes:  Negative for discharge and visual disturbance.   Respiratory:  Negative for chest tightness and wheezing.    Cardiovascular:  Negative for chest pain and palpitations.   Gastrointestinal:  Negative for blood in stool, constipation, diarrhea and vomiting.   Endocrine: Negative for polydipsia and polyuria.   Genitourinary:  Negative for difficulty urinating, dysuria, hematuria and menstrual problem.   Musculoskeletal:  Negative for arthralgias, joint swelling and neck pain.   Neurological:  Negative for weakness and headaches.   Psychiatric/Behavioral:  Negative for confusion and dysphoric mood.    All other systems reviewed and are negative.       Objective:     Vitals:    03/29/23 1343   BP: 138/84   Pulse: (!) 56        Physical Exam  Vitals and nursing note reviewed.   Constitutional:       General: She is not in acute distress.     Appearance: Normal appearance. She is obese. She is not ill-appearing, toxic-appearing or diaphoretic.   HENT:      Head: Normocephalic and atraumatic.   Eyes:      General: No scleral icterus.     Conjunctiva/sclera: Conjunctivae normal.   Cardiovascular:      Rate and Rhythm: Normal rate.      Heart sounds: Normal heart sounds.   Pulmonary:      Effort: Pulmonary effort is normal. No respiratory distress.      Breath sounds: Normal breath sounds.   Abdominal:      General: Bowel sounds are  normal.   Genitourinary:     Comments: Left lower labia with small warty lesion. Typical coloration and appearance.   Skin:     Coloration: Skin is not pale.   Neurological:      Mental Status: She is alert. Mental status is at baseline.   Psychiatric:         Attention and Perception: Attention and perception normal.         Mood and Affect: Mood and affect normal.         Speech: Speech normal.         Behavior: Behavior normal.         Cognition and Memory: Cognition and memory normal.         Judgment: Judgment normal.       Assessment:       1. Encounter for general adult medical examination with abnormal findings    2. Prediabetes    3. Elevated blood pressure reading in office without diagnosis of hypertension    4. Family history of diabetes mellitus    5. Class 3 severe obesity due to excess calories with serious comorbidity and body mass index (BMI) of 40.0 to 44.9 in adult    6. Unable to lose weight    7. Routine screening for STI (sexually transmitted infection)    8. Colon cancer screening    9. Immunization due    10. Caregiver stress    11. Adjustment disorder with depressed mood    12. Radial scar of breast    13. Genital warts due to HPV (human papillomavirus)        Plan:         Encounter for general adult medical examination with abnormal findings  -     CBC Auto Differential; Future; Expected date: 03/29/2023  -     Comprehensive Metabolic Panel; Future; Expected date: 03/29/2023  -     Hemoglobin A1C; Future; Expected date: 03/29/2023  -     Lipid Panel; Future; Expected date: 03/29/2023  -     TSH; Future; Expected date: 03/29/2023  - Risk and age appropriate anticipatory guidance.  reviewed and updated. Recommendations discussed with patient as appropriate.     Prediabetes  Family history of diabetes mellitus  -     Hemoglobin A1C; Future; Expected date: 03/29/2023  -     semaglutide (OZEMPIC) 0.25 mg or 0.5 mg(2 mg/1.5 mL) pen injector; Inject 0.25mg SC weekly for weeks 1-4, then inject  0.5mg SC weekly for weeks 4-8  Dispense: 3 mL; Refill: 0  -     Ambulatory referral/consult to Nutrition Services; Future; Expected date: 04/05/2023  - Lifestyle modifications. Ozempic if covered.    Elevated blood pressure reading in office without diagnosis of hypertension  - Lifestyle modifications.     Class 3 severe obesity due to excess calories with serious comorbidity and body mass index (BMI) of 40.0 to 44.9 in adult  -     Hemoglobin A1C; Future; Expected date: 03/29/2023  -     Lipid Panel; Future; Expected date: 03/29/2023  -     TSH; Future; Expected date: 03/29/2023  -     semaglutide (OZEMPIC) 0.25 mg or 0.5 mg(2 mg/1.5 mL) pen injector; Inject 0.25mg SC weekly for weeks 1-4, then inject 0.5mg SC weekly for weeks 4-8  Dispense: 3 mL; Refill: 0  - Lifestyle modifications.     Routine screening for STI (sexually transmitted infection)  -     C. trachomatis/N. gonorrhoeae by AMP DNA; Future  -     HIV 1/2 Ag/Ab (4th Gen); Future; Expected date: 03/29/2023  -     RPR; Future; Expected date: 03/29/2023    Colon cancer screening  -     Fecal Immunochemical Test (iFOBT); Future; Expected date: 03/29/2023    Immunization due  -     (In Office Administered) Tdap Vaccine    Caregiver stress  Adjustment disorder with depressed mood  - Self management strategies and available resources discussed.     Radial scar of breast  Chronic, stable. Palpable scar tissue. Reassured patient that it did not feel like pocket of fluid.     Genital warts due to HPV (human papillomavirus)  -     imiquimod (ALDARA) 5 % cream; Apply a thin layer 3 times per week (on alternate days) prior to bedtime; leave on skin for 6 to 10 hours, then remove with mild soap and water.  Dispense: 24 packet; Refill: 1    Patient's questions answered. Plan reviewed with patient at the end of visit. Relevant precautions to chief complaint and reasons to seek medical care or contact the office sooner reviewed with patient.     Follow up in about 4  weeks (around 4/26/2023) for BP Follow-up, Results Review.

## 2023-03-30 DIAGNOSIS — R63.8 UNABLE TO LOSE WEIGHT: ICD-10-CM

## 2023-03-30 DIAGNOSIS — E66.01 CLASS 3 SEVERE OBESITY DUE TO EXCESS CALORIES WITH SERIOUS COMORBIDITY AND BODY MASS INDEX (BMI) OF 40.0 TO 44.9 IN ADULT: ICD-10-CM

## 2023-03-30 DIAGNOSIS — R73.03 PREDIABETES: ICD-10-CM

## 2023-03-30 RX ORDER — SEMAGLUTIDE 1.34 MG/ML
INJECTION, SOLUTION SUBCUTANEOUS
Qty: 3 ML | Refills: 0 | Status: SHIPPED | OUTPATIENT
Start: 2023-03-30 | End: 2023-04-05 | Stop reason: SDUPTHER

## 2023-03-30 NOTE — TELEPHONE ENCOUNTER
Care Due:                  Date            Visit Type   Department     Provider  --------------------------------------------------------------------------------                                MYCHART                              ANNUAL                              CHECKUP/PHY  [facility] FAMILY  Last Visit: 03-      S            MEDICINE       Sissy Zazueta                              EP -                              [facility]  Next Visit: 04-      CARE ([facility])   MEDICINE       Sissy Zazueta                                                            Last  Test          Frequency    Reason                     Performed    Due Date  --------------------------------------------------------------------------------    HBA1C.......  6 months...  semaglutide..............  02- 08-    [facility] Embedded Care Gaps. Reference number: [id number]. 3/30/2023   9:43:29 AM CDT

## 2023-03-30 NOTE — TELEPHONE ENCOUNTER
Refill Routing Note   Medication(s) are not appropriate for processing by [facility] for the following reason(s):      Required labs outdated  New or recently adjusted medication    [facility] action(s):  Defer       Medication Therapy Plan: Requesting to different pharmacy      Appointments  past 12m or future 3m with PCP    Date Provider   Last Visit   3/29/2023 Sissy Zazueta MD   Next Visit   4/28/2023 Sissy Zazueta MD   ED visits in past 90 days: 1        Note composed:1:25 PM 03/30/2023

## 2023-03-31 ENCOUNTER — LAB VISIT (OUTPATIENT)
Dept: LAB | Facility: HOSPITAL | Age: 48
End: 2023-03-31
Attending: FAMILY MEDICINE
Payer: COMMERCIAL

## 2023-03-31 DIAGNOSIS — E66.01 CLASS 3 SEVERE OBESITY DUE TO EXCESS CALORIES WITH SERIOUS COMORBIDITY AND BODY MASS INDEX (BMI) OF 40.0 TO 44.9 IN ADULT: ICD-10-CM

## 2023-03-31 DIAGNOSIS — Z11.3 ROUTINE SCREENING FOR STI (SEXUALLY TRANSMITTED INFECTION): ICD-10-CM

## 2023-03-31 DIAGNOSIS — Z00.01 ENCOUNTER FOR GENERAL ADULT MEDICAL EXAMINATION WITH ABNORMAL FINDINGS: ICD-10-CM

## 2023-03-31 DIAGNOSIS — R73.03 PREDIABETES: ICD-10-CM

## 2023-03-31 LAB
ALBUMIN SERPL BCP-MCNC: 3.8 G/DL (ref 3.5–5.2)
ALP SERPL-CCNC: 110 U/L (ref 38–126)
ALT SERPL W/O P-5'-P-CCNC: 48 U/L (ref 10–44)
ANION GAP SERPL CALC-SCNC: 4 MMOL/L (ref 8–16)
AST SERPL-CCNC: 43 U/L (ref 15–46)
BASOPHILS # BLD AUTO: 0.04 K/UL (ref 0–0.2)
BASOPHILS NFR BLD: 1 % (ref 0–1.9)
BILIRUB SERPL-MCNC: 0.6 MG/DL (ref 0.1–1)
CALCIUM SERPL-MCNC: 8.7 MG/DL (ref 8.7–10.5)
CHLORIDE SERPL-SCNC: 109 MMOL/L (ref 95–110)
CHOLEST SERPL-MCNC: 171 MG/DL (ref 120–199)
CHOLEST/HDLC SERPL: 4.9 {RATIO} (ref 2–5)
CO2 SERPL-SCNC: 26 MMOL/L (ref 23–29)
CREAT SERPL-MCNC: 0.59 MG/DL (ref 0.5–1.4)
DIFFERENTIAL METHOD: ABNORMAL
EOSINOPHIL # BLD AUTO: 0.2 K/UL (ref 0–0.5)
EOSINOPHIL NFR BLD: 4.8 % (ref 0–8)
ERYTHROCYTE [DISTWIDTH] IN BLOOD BY AUTOMATED COUNT: 14.4 % (ref 11.5–14.5)
EST. GFR  (NO RACE VARIABLE): >60 ML/MIN/1.73 M^2
ESTIMATED AVG GLUCOSE: 120 MG/DL (ref 68–131)
GLUCOSE SERPL-MCNC: 90 MG/DL (ref 70–110)
HBA1C MFR BLD: 5.8 % (ref 4–5.6)
HCT VFR BLD AUTO: 42.1 % (ref 37–48.5)
HDLC SERPL-MCNC: 35 MG/DL (ref 40–75)
HDLC SERPL: 20.5 % (ref 20–50)
HGB BLD-MCNC: 13.3 G/DL (ref 12–16)
HIV 1+2 AB+HIV1 P24 AG SERPL QL IA: NORMAL
IMM GRANULOCYTES # BLD AUTO: 0.01 K/UL (ref 0–0.04)
IMM GRANULOCYTES NFR BLD AUTO: 0.3 % (ref 0–0.5)
LDLC SERPL CALC-MCNC: 126 MG/DL (ref 63–159)
LYMPHOCYTES # BLD AUTO: 1.2 K/UL (ref 1–4.8)
LYMPHOCYTES NFR BLD: 30.6 % (ref 18–48)
MCH RBC QN AUTO: 27 PG (ref 27–31)
MCHC RBC AUTO-ENTMCNC: 31.6 G/DL (ref 32–36)
MCV RBC AUTO: 85 FL (ref 82–98)
MONOCYTES # BLD AUTO: 0.7 K/UL (ref 0.3–1)
MONOCYTES NFR BLD: 17.7 % (ref 4–15)
NEUTROPHILS # BLD AUTO: 1.8 K/UL (ref 1.8–7.7)
NEUTROPHILS NFR BLD: 45.6 % (ref 38–73)
NONHDLC SERPL-MCNC: 136 MG/DL
NRBC BLD-RTO: 0 /100 WBC
PLATELET # BLD AUTO: 223 K/UL (ref 150–450)
PMV BLD AUTO: 12.4 FL (ref 9.2–12.9)
POTASSIUM SERPL-SCNC: 3.9 MMOL/L (ref 3.5–5.1)
PROT SERPL-MCNC: 7.5 G/DL (ref 6–8.4)
RBC # BLD AUTO: 4.93 M/UL (ref 4–5.4)
SODIUM SERPL-SCNC: 139 MMOL/L (ref 136–145)
TRIGL SERPL-MCNC: 50 MG/DL (ref 30–150)
TSH SERPL DL<=0.005 MIU/L-ACNC: 1.33 UIU/ML (ref 0.4–4)
UUN UR-MCNC: 18 MG/DL (ref 7–17)
WBC # BLD AUTO: 3.96 K/UL (ref 3.9–12.7)

## 2023-03-31 PROCEDURE — 87389 HIV-1 AG W/HIV-1&-2 AB AG IA: CPT | Mod: PO | Performed by: FAMILY MEDICINE

## 2023-03-31 PROCEDURE — 85025 COMPLETE CBC W/AUTO DIFF WBC: CPT | Mod: PO | Performed by: FAMILY MEDICINE

## 2023-03-31 PROCEDURE — 80061 LIPID PANEL: CPT | Performed by: FAMILY MEDICINE

## 2023-03-31 PROCEDURE — 84443 ASSAY THYROID STIM HORMONE: CPT | Mod: PO | Performed by: FAMILY MEDICINE

## 2023-03-31 PROCEDURE — 83036 HEMOGLOBIN GLYCOSYLATED A1C: CPT | Performed by: FAMILY MEDICINE

## 2023-03-31 PROCEDURE — 80053 COMPREHEN METABOLIC PANEL: CPT | Mod: PO | Performed by: FAMILY MEDICINE

## 2023-03-31 PROCEDURE — 86592 SYPHILIS TEST NON-TREP QUAL: CPT | Mod: PO | Performed by: FAMILY MEDICINE

## 2023-03-31 PROCEDURE — 36415 COLL VENOUS BLD VENIPUNCTURE: CPT | Mod: PO | Performed by: FAMILY MEDICINE

## 2023-04-01 LAB — RPR SER QL: NORMAL

## 2023-04-03 DIAGNOSIS — E66.01 CLASS 3 SEVERE OBESITY DUE TO EXCESS CALORIES WITH SERIOUS COMORBIDITY AND BODY MASS INDEX (BMI) OF 40.0 TO 44.9 IN ADULT: ICD-10-CM

## 2023-04-03 DIAGNOSIS — R73.03 PREDIABETES: ICD-10-CM

## 2023-04-03 DIAGNOSIS — R63.8 UNABLE TO LOSE WEIGHT: ICD-10-CM

## 2023-04-03 PROBLEM — R79.89 ELEVATED LFTS: Status: ACTIVE | Noted: 2023-04-03

## 2023-04-03 RX ORDER — SEMAGLUTIDE 1.34 MG/ML
INJECTION, SOLUTION SUBCUTANEOUS
Qty: 3 ML | Refills: 0 | OUTPATIENT
Start: 2023-04-03 | End: 2024-04-02

## 2023-04-03 NOTE — TELEPHONE ENCOUNTER
No new care gaps identified.  [facility] Embedded Care Gaps. Reference number: [id number]. 4/03/2023   2:08:04 PM CDT

## 2023-04-03 NOTE — TELEPHONE ENCOUNTER
Refill Decision Note  Quick DC. Request already responded to by other means (e.g. phone or fax)    So Winkler  is requesting a refill authorization.  Brief Assessment and Rationale for Refill:  Quick Discontinue     Medication Therapy Plan:       Medication Reconciliation Completed: No   Comments:     No Care Gaps recommended.     Note composed:2:43 PM 04/03/2023

## 2023-04-04 ENCOUNTER — PATIENT MESSAGE (OUTPATIENT)
Dept: RESEARCH | Facility: HOSPITAL | Age: 48
End: 2023-04-04
Payer: COMMERCIAL

## 2023-04-04 ENCOUNTER — PATIENT MESSAGE (OUTPATIENT)
Dept: FAMILY MEDICINE | Facility: CLINIC | Age: 48
End: 2023-04-04
Payer: COMMERCIAL

## 2023-04-05 ENCOUNTER — PATIENT MESSAGE (OUTPATIENT)
Dept: FAMILY MEDICINE | Facility: CLINIC | Age: 48
End: 2023-04-05

## 2023-04-05 ENCOUNTER — OFFICE VISIT (OUTPATIENT)
Dept: FAMILY MEDICINE | Facility: CLINIC | Age: 48
End: 2023-04-05
Payer: COMMERCIAL

## 2023-04-05 VITALS
SYSTOLIC BLOOD PRESSURE: 120 MMHG | OXYGEN SATURATION: 97 % | WEIGHT: 255.5 LBS | HEART RATE: 70 BPM | HEIGHT: 66 IN | BODY MASS INDEX: 41.06 KG/M2 | DIASTOLIC BLOOD PRESSURE: 80 MMHG

## 2023-04-05 DIAGNOSIS — R59.9 SWOLLEN LYMPH NODES: ICD-10-CM

## 2023-04-05 DIAGNOSIS — T80.69XA ALLERGIC REACTION TO VACCINE: Primary | ICD-10-CM

## 2023-04-05 DIAGNOSIS — R73.03 PREDIABETES: ICD-10-CM

## 2023-04-05 DIAGNOSIS — E66.01 CLASS 3 SEVERE OBESITY DUE TO EXCESS CALORIES WITH SERIOUS COMORBIDITY AND BODY MASS INDEX (BMI) OF 40.0 TO 44.9 IN ADULT: ICD-10-CM

## 2023-04-05 DIAGNOSIS — R63.8 UNABLE TO LOSE WEIGHT: ICD-10-CM

## 2023-04-05 PROCEDURE — 3008F PR BODY MASS INDEX (BMI) DOCUMENTED: ICD-10-PCS | Mod: CPTII,S$GLB,, | Performed by: FAMILY MEDICINE

## 2023-04-05 PROCEDURE — 99213 PR OFFICE/OUTPT VISIT, EST, LEVL III, 20-29 MIN: ICD-10-PCS | Mod: S$GLB,,, | Performed by: FAMILY MEDICINE

## 2023-04-05 PROCEDURE — 1160F PR REVIEW ALL MEDS BY PRESCRIBER/CLIN PHARMACIST DOCUMENTED: ICD-10-PCS | Mod: CPTII,S$GLB,, | Performed by: FAMILY MEDICINE

## 2023-04-05 PROCEDURE — 3074F SYST BP LT 130 MM HG: CPT | Mod: CPTII,S$GLB,, | Performed by: FAMILY MEDICINE

## 2023-04-05 PROCEDURE — 99999 PR PBB SHADOW E&M-EST. PATIENT-LVL IV: CPT | Mod: PBBFAC,,, | Performed by: FAMILY MEDICINE

## 2023-04-05 PROCEDURE — 3044F PR MOST RECENT HEMOGLOBIN A1C LEVEL <7.0%: ICD-10-PCS | Mod: CPTII,S$GLB,, | Performed by: FAMILY MEDICINE

## 2023-04-05 PROCEDURE — 3079F PR MOST RECENT DIASTOLIC BLOOD PRESSURE 80-89 MM HG: ICD-10-PCS | Mod: CPTII,S$GLB,, | Performed by: FAMILY MEDICINE

## 2023-04-05 PROCEDURE — 3044F HG A1C LEVEL LT 7.0%: CPT | Mod: CPTII,S$GLB,, | Performed by: FAMILY MEDICINE

## 2023-04-05 PROCEDURE — 3074F PR MOST RECENT SYSTOLIC BLOOD PRESSURE < 130 MM HG: ICD-10-PCS | Mod: CPTII,S$GLB,, | Performed by: FAMILY MEDICINE

## 2023-04-05 PROCEDURE — 1159F MED LIST DOCD IN RCRD: CPT | Mod: CPTII,S$GLB,, | Performed by: FAMILY MEDICINE

## 2023-04-05 PROCEDURE — 99213 OFFICE O/P EST LOW 20 MIN: CPT | Mod: S$GLB,,, | Performed by: FAMILY MEDICINE

## 2023-04-05 PROCEDURE — 3008F BODY MASS INDEX DOCD: CPT | Mod: CPTII,S$GLB,, | Performed by: FAMILY MEDICINE

## 2023-04-05 PROCEDURE — 1159F PR MEDICATION LIST DOCUMENTED IN MEDICAL RECORD: ICD-10-PCS | Mod: CPTII,S$GLB,, | Performed by: FAMILY MEDICINE

## 2023-04-05 PROCEDURE — 99999 PR PBB SHADOW E&M-EST. PATIENT-LVL IV: ICD-10-PCS | Mod: PBBFAC,,, | Performed by: FAMILY MEDICINE

## 2023-04-05 PROCEDURE — 3079F DIAST BP 80-89 MM HG: CPT | Mod: CPTII,S$GLB,, | Performed by: FAMILY MEDICINE

## 2023-04-05 PROCEDURE — 1160F RVW MEDS BY RX/DR IN RCRD: CPT | Mod: CPTII,S$GLB,, | Performed by: FAMILY MEDICINE

## 2023-04-05 RX ORDER — HYDROXYZINE HYDROCHLORIDE 25 MG/1
25 TABLET, FILM COATED ORAL 3 TIMES DAILY
Qty: 15 TABLET | Refills: 0 | Status: SHIPPED | OUTPATIENT
Start: 2023-04-05 | End: 2023-04-18 | Stop reason: SDUPTHER

## 2023-04-05 RX ORDER — PREDNISONE 20 MG/1
40 TABLET ORAL DAILY
Qty: 10 TABLET | Refills: 0 | Status: SHIPPED | OUTPATIENT
Start: 2023-04-05 | End: 2023-04-18

## 2023-04-05 RX ORDER — SEMAGLUTIDE 1.34 MG/ML
INJECTION, SOLUTION SUBCUTANEOUS
Qty: 3 ML | Refills: 0 | Status: SHIPPED | OUTPATIENT
Start: 2023-04-05 | End: 2023-04-18

## 2023-04-05 RX ORDER — FAMOTIDINE 40 MG/1
40 TABLET, FILM COATED ORAL 2 TIMES DAILY
Qty: 10 TABLET | Refills: 0 | Status: SHIPPED | OUTPATIENT
Start: 2023-04-05 | End: 2023-04-18

## 2023-04-05 NOTE — TELEPHONE ENCOUNTER
No new care gaps identified.  [facility] Embedded Care Gaps. Reference number: [id number]. 4/05/2023   10:19:34 AM WILLIET

## 2023-04-05 NOTE — TELEPHONE ENCOUNTER
Refill Routing Note   Medication(s) are not appropriate for processing by [facility] for the following reason(s):      New or recently adjusted medication  Different pharmacy requesting medication from previous order    [facility] action(s):  Defer       Medication Therapy Plan: different pharmacy requesting fill      Appointments  past 12m or future 3m with PCP    Date Provider   Last Visit   3/29/2023 Sissy Zazueta MD   Next Visit   4/5/2023 Sissy Zazueta MD   ED visits in past 90 days: 1        Note composed:12:09 PM 04/05/2023

## 2023-04-05 NOTE — PROGRESS NOTES
Subjective:       Patient ID: So Winkler is a 47 y.o. female.    Chief Complaint: Urticaria    Patient Active Problem List   Diagnosis    Class 3 severe obesity due to excess calories with serious comorbidity and body mass index (BMI) of 40.0 to 44.9 in adult    Prediabetes    Family history of diabetes mellitus    Radial scar of breast    Elevated LFTs      HPI  48 yo female presents today with soft nodules in areas with tattoo and swollen lymph node on left neck a few days after Tdap injection on 03/29/2023.   No fever, chills, dyspnea. Increased fatigue.     Review of Systems   All other systems reviewed and are negative.       Objective:     Vitals:    04/05/23 1435   BP: 120/80   Pulse: 70        Physical Exam  Vitals and nursing note reviewed.   Constitutional:       General: She is not in acute distress.     Appearance: She is not ill-appearing, toxic-appearing or diaphoretic.   HENT:      Head: Normocephalic and atraumatic.   Eyes:      General: No scleral icterus.     Extraocular Movements: Extraocular movements intact.      Conjunctiva/sclera: Conjunctivae normal.   Pulmonary:      Effort: No respiratory distress.   Skin:     Comments: Skin in areas of tattoo with palpable subcutaneous nodules, raised areas of nodule but no color change. R anterior neck with tender LN, soft 1.5cm.    Neurological:      General: No focal deficit present.      Mental Status: She is alert. Mental status is at baseline.   Psychiatric:         Attention and Perception: Attention and perception normal.         Mood and Affect: Mood and affect normal.         Speech: Speech normal.         Behavior: Behavior normal.         Thought Content: Thought content normal.         Cognition and Memory: Cognition and memory normal.         Judgment: Judgment normal.         Assessment:       1. Allergic reaction to vaccine    2. Swollen lymph nodes          Plan:         Allergic reaction to vaccine  Swollen lymph nodes  -      hydrOXYzine HCL (ATARAX) 25 MG tablet; Take 1 tablet (25 mg total) by mouth 3 (three) times daily. For allergy reaction for 5 days  Dispense: 15 tablet; Refill: 0  -     famotidine (PEPCID) 40 MG tablet; Take 1 tablet (40 mg total) by mouth 2 (two) times daily. For allergy reaction for 5 days  Dispense: 10 tablet; Refill: 0  -     predniSONE (DELTASONE) 20 MG tablet; Take 2 tablets (40 mg total) by mouth once daily. for 5 days  Dispense: 10 tablet; Refill: 0    Appears to have an immune mediated response - cross reaction to tattoo ink? Nodules palpable only in areas with tattoo. Isolated tender LN on right neck. Will treat as allergic reaction, with some immune system suppression from Prednisone. If does not resolve, will refer to allergy.     Patient's questions answered. Plan reviewed with patient at the end of visit. Relevant precautions to chief complaint and reasons to seek medical care or contact the office sooner reviewed with patient.     Follow up in about 10 days (around 4/15/2023) for allergic reaction .

## 2023-04-06 ENCOUNTER — LAB VISIT (OUTPATIENT)
Dept: LAB | Facility: HOSPITAL | Age: 48
End: 2023-04-06
Attending: FAMILY MEDICINE
Payer: COMMERCIAL

## 2023-04-06 DIAGNOSIS — Z12.11 COLON CANCER SCREENING: ICD-10-CM

## 2023-04-06 LAB — HEMOCCULT STL QL IA: NEGATIVE

## 2023-04-06 PROCEDURE — 82274 ASSAY TEST FOR BLOOD FECAL: CPT | Performed by: FAMILY MEDICINE

## 2023-04-18 ENCOUNTER — PATIENT MESSAGE (OUTPATIENT)
Dept: FAMILY MEDICINE | Facility: CLINIC | Age: 48
End: 2023-04-18

## 2023-04-18 ENCOUNTER — OFFICE VISIT (OUTPATIENT)
Dept: FAMILY MEDICINE | Facility: CLINIC | Age: 48
End: 2023-04-18
Payer: COMMERCIAL

## 2023-04-18 VITALS
BODY MASS INDEX: 42.77 KG/M2 | OXYGEN SATURATION: 97 % | HEART RATE: 65 BPM | HEIGHT: 66 IN | WEIGHT: 266.13 LBS | DIASTOLIC BLOOD PRESSURE: 84 MMHG | SYSTOLIC BLOOD PRESSURE: 134 MMHG

## 2023-04-18 DIAGNOSIS — Z83.3 FAMILY HISTORY OF DIABETES MELLITUS: ICD-10-CM

## 2023-04-18 DIAGNOSIS — R73.03 PREDIABETES: ICD-10-CM

## 2023-04-18 DIAGNOSIS — T80.69XA ALLERGIC REACTION TO VACCINE: Primary | ICD-10-CM

## 2023-04-18 DIAGNOSIS — R79.89 ELEVATED LFTS: ICD-10-CM

## 2023-04-18 DIAGNOSIS — E78.6 LOW HDL (UNDER 40): ICD-10-CM

## 2023-04-18 DIAGNOSIS — R59.9 SWOLLEN LYMPH NODES: ICD-10-CM

## 2023-04-18 DIAGNOSIS — E66.01 CLASS 3 SEVERE OBESITY DUE TO EXCESS CALORIES WITH SERIOUS COMORBIDITY AND BODY MASS INDEX (BMI) OF 40.0 TO 44.9 IN ADULT: ICD-10-CM

## 2023-04-18 PROCEDURE — 3075F PR MOST RECENT SYSTOLIC BLOOD PRESS GE 130-139MM HG: ICD-10-PCS | Mod: CPTII,S$GLB,, | Performed by: FAMILY MEDICINE

## 2023-04-18 PROCEDURE — 3079F PR MOST RECENT DIASTOLIC BLOOD PRESSURE 80-89 MM HG: ICD-10-PCS | Mod: CPTII,S$GLB,, | Performed by: FAMILY MEDICINE

## 2023-04-18 PROCEDURE — 3044F PR MOST RECENT HEMOGLOBIN A1C LEVEL <7.0%: ICD-10-PCS | Mod: CPTII,S$GLB,, | Performed by: FAMILY MEDICINE

## 2023-04-18 PROCEDURE — 1159F MED LIST DOCD IN RCRD: CPT | Mod: CPTII,S$GLB,, | Performed by: FAMILY MEDICINE

## 2023-04-18 PROCEDURE — 3075F SYST BP GE 130 - 139MM HG: CPT | Mod: CPTII,S$GLB,, | Performed by: FAMILY MEDICINE

## 2023-04-18 PROCEDURE — 99213 PR OFFICE/OUTPT VISIT, EST, LEVL III, 20-29 MIN: ICD-10-PCS | Mod: S$GLB,,, | Performed by: FAMILY MEDICINE

## 2023-04-18 PROCEDURE — 3044F HG A1C LEVEL LT 7.0%: CPT | Mod: CPTII,S$GLB,, | Performed by: FAMILY MEDICINE

## 2023-04-18 PROCEDURE — 3008F BODY MASS INDEX DOCD: CPT | Mod: CPTII,S$GLB,, | Performed by: FAMILY MEDICINE

## 2023-04-18 PROCEDURE — 3008F PR BODY MASS INDEX (BMI) DOCUMENTED: ICD-10-PCS | Mod: CPTII,S$GLB,, | Performed by: FAMILY MEDICINE

## 2023-04-18 PROCEDURE — 3079F DIAST BP 80-89 MM HG: CPT | Mod: CPTII,S$GLB,, | Performed by: FAMILY MEDICINE

## 2023-04-18 PROCEDURE — 99999 PR PBB SHADOW E&M-EST. PATIENT-LVL III: CPT | Mod: PBBFAC,,, | Performed by: FAMILY MEDICINE

## 2023-04-18 PROCEDURE — 99213 OFFICE O/P EST LOW 20 MIN: CPT | Mod: S$GLB,,, | Performed by: FAMILY MEDICINE

## 2023-04-18 PROCEDURE — 99999 PR PBB SHADOW E&M-EST. PATIENT-LVL III: ICD-10-PCS | Mod: PBBFAC,,, | Performed by: FAMILY MEDICINE

## 2023-04-18 PROCEDURE — 1159F PR MEDICATION LIST DOCUMENTED IN MEDICAL RECORD: ICD-10-PCS | Mod: CPTII,S$GLB,, | Performed by: FAMILY MEDICINE

## 2023-04-18 RX ORDER — HYDROXYZINE HYDROCHLORIDE 25 MG/1
25 TABLET, FILM COATED ORAL 3 TIMES DAILY PRN
Qty: 60 TABLET | Refills: 0 | Status: SHIPPED | OUTPATIENT
Start: 2023-04-18 | End: 2023-07-28

## 2023-04-18 RX ORDER — METFORMIN HYDROCHLORIDE 750 MG/1
750 TABLET, EXTENDED RELEASE ORAL NIGHTLY
Qty: 90 TABLET | Refills: 1 | Status: SHIPPED | OUTPATIENT
Start: 2023-04-18 | End: 2023-04-19 | Stop reason: SDUPTHER

## 2023-04-18 NOTE — PROGRESS NOTES
Subjective:       Patient ID: So Winkler is a 47 y.o. female.    Chief Complaint: Allergic Reaction    Patient Active Problem List   Diagnosis    Class 3 severe obesity due to excess calories with serious comorbidity and body mass index (BMI) of 40.0 to 44.9 in adult    Prediabetes    Family history of diabetes mellitus    Radial scar of breast    Elevated LFTs    Low HDL (under 40)      HPI  46 yo female presents for follow up after allergic reaction s/p Tdap. Patient with palpable soft tissue lumps under area with tattoos only.   Prescribed Hydroxyzine, Famotidine and Prednisone po. Completed course.   Reports overall improvement with few residual dry skin areas over larger tattoos.   Suspect immune mediated response - cross reaction to tattoo ink?   LN on right neck no longer enlarged.     Review of Systems   All other systems reviewed and are negative.       Objective:     Vitals:    04/18/23 0853   BP: 134/84   Pulse: 65        Physical Exam  Vitals and nursing note reviewed.   Constitutional:       General: She is not in acute distress.     Appearance: Normal appearance. She is not ill-appearing, toxic-appearing or diaphoretic.   HENT:      Head: Normocephalic and atraumatic.   Eyes:      General: No scleral icterus.     Conjunctiva/sclera: Conjunctivae normal.   Cardiovascular:      Rate and Rhythm: Normal rate.   Pulmonary:      Effort: Pulmonary effort is normal. No respiratory distress.   Skin:     Coloration: Skin is not pale.      Comments: No palpable lumps under tattoo areas. Dry skin patch, sparse over lower back tattoo.    Neurological:      Mental Status: She is alert. Mental status is at baseline.   Psychiatric:         Attention and Perception: Attention and perception normal.         Mood and Affect: Mood and affect normal.         Speech: Speech normal.         Behavior: Behavior normal.         Cognition and Memory: Cognition and memory normal.         Judgment: Judgment normal.        Assessment:       1. Allergic reaction to vaccine    2. Swollen lymph nodes    3. Prediabetes    4. Family history of diabetes mellitus    5. Class 3 severe obesity due to excess calories with serious comorbidity and body mass index (BMI) of 40.0 to 44.9 in adult    6. Low HDL (under 40)    7. Elevated LFTs        Plan:         Allergic reaction to vaccine  Swollen lymph nodes  -     hydrOXYzine HCL (ATARAX) 25 MG tablet; Take 1 tablet (25 mg total) by mouth 3 (three) times daily as needed for Itching. For allergy reaction  Dispense: 60 tablet; Refill: 0  - Improved and near resolved. Continues to have some mild itching. Can continue hydroxyzine prn. OTC hydrocortisone 1% for sparse dry pathces.     Prediabetes  Family history of diabetes mellitus  -     metFORMIN (GLUCOPHAGE-XR) 750 MG ER 24hr tablet; Take 1 tablet (750 mg total) by mouth every evening.  Dispense: 90 tablet; Refill: 1  - 5.8% increase from 5.7% 1 year ago    Class 3 severe obesity due to excess calories with serious comorbidity and body mass index (BMI) of 40.0 to 44.9 in adult  - Lifestyle modifications. Ozempic was not covered.     Low HDL (under 40)  - LDL 35, worsened from prior at 42 1 year ago. Lifestyle modifications.     The 10-year ASCVD risk score (Baylee DK, et al., 2019) is: 2.7%    Values used to calculate the score:      Age: 47 years      Sex: Female      Is Non- : Yes      Diabetic: No      Tobacco smoker: No      Systolic Blood Pressure: 134 mmHg      Is BP treated: No      HDL Cholesterol: 35 mg/dL      Total Cholesterol: 171 mg/dL    Elevated LFTs  -     US Abdomen Limited; Future  Hep B and C testing WNL last year. No new exposures.     Patient's questions answered. Plan reviewed with patient at the end of visit. Relevant precautions to chief complaint and reasons to seek medical care or contact the office sooner reviewed with patient.     Follow up in about 3 months (around 7/18/2023) for Weight Loss  Follow-up.

## 2023-04-19 ENCOUNTER — PATIENT MESSAGE (OUTPATIENT)
Dept: RESEARCH | Facility: HOSPITAL | Age: 48
End: 2023-04-19
Payer: COMMERCIAL

## 2023-04-19 ENCOUNTER — PATIENT MESSAGE (OUTPATIENT)
Dept: FAMILY MEDICINE | Facility: CLINIC | Age: 48
End: 2023-04-19
Payer: COMMERCIAL

## 2023-04-19 DIAGNOSIS — Z83.3 FAMILY HISTORY OF DIABETES MELLITUS: ICD-10-CM

## 2023-04-19 DIAGNOSIS — R73.03 PREDIABETES: ICD-10-CM

## 2023-04-19 NOTE — TELEPHONE ENCOUNTER
No new care gaps identified.  [facility] Embedded Care Gaps. Reference number: [id number]. 4/19/2023   11:50:20 AM CDT

## 2023-04-20 RX ORDER — METFORMIN HYDROCHLORIDE 750 MG/1
750 TABLET, EXTENDED RELEASE ORAL NIGHTLY
Qty: 90 TABLET | Refills: 1 | Status: SHIPPED | OUTPATIENT
Start: 2023-04-20 | End: 2023-06-16 | Stop reason: SINTOL

## 2023-04-25 ENCOUNTER — PATIENT MESSAGE (OUTPATIENT)
Dept: FAMILY MEDICINE | Facility: CLINIC | Age: 48
End: 2023-04-25
Payer: COMMERCIAL

## 2023-05-04 ENCOUNTER — TELEPHONE (OUTPATIENT)
Dept: ADMINISTRATIVE | Facility: OTHER | Age: 48
End: 2023-05-04
Payer: COMMERCIAL

## 2023-06-10 ENCOUNTER — PATIENT MESSAGE (OUTPATIENT)
Dept: FAMILY MEDICINE | Facility: CLINIC | Age: 48
End: 2023-06-10
Payer: COMMERCIAL

## 2023-06-12 ENCOUNTER — TELEPHONE (OUTPATIENT)
Dept: FAMILY MEDICINE | Facility: CLINIC | Age: 48
End: 2023-06-12
Payer: COMMERCIAL

## 2023-06-12 NOTE — TELEPHONE ENCOUNTER
Patient states that know has returned to her neck. Painful. Given appointment to see Dr. Zazueta on Friday, June 16. Verbalized understanding.

## 2023-06-16 ENCOUNTER — LAB VISIT (OUTPATIENT)
Dept: LAB | Facility: HOSPITAL | Age: 48
End: 2023-06-16
Attending: FAMILY MEDICINE
Payer: COMMERCIAL

## 2023-06-16 ENCOUNTER — OFFICE VISIT (OUTPATIENT)
Dept: FAMILY MEDICINE | Facility: CLINIC | Age: 48
End: 2023-06-16
Payer: COMMERCIAL

## 2023-06-16 VITALS
DIASTOLIC BLOOD PRESSURE: 60 MMHG | BODY MASS INDEX: 42.45 KG/M2 | SYSTOLIC BLOOD PRESSURE: 118 MMHG | HEART RATE: 53 BPM | WEIGHT: 263 LBS | OXYGEN SATURATION: 99 %

## 2023-06-16 DIAGNOSIS — A63.0 GENITAL WARTS DUE TO HPV (HUMAN PAPILLOMAVIRUS): ICD-10-CM

## 2023-06-16 DIAGNOSIS — R59.0 ENLARGED LYMPH NODE IN NECK: ICD-10-CM

## 2023-06-16 DIAGNOSIS — R59.0 ENLARGED LYMPH NODE IN NECK: Primary | ICD-10-CM

## 2023-06-16 DIAGNOSIS — Z12.31 ENCOUNTER FOR SCREENING MAMMOGRAM FOR MALIGNANT NEOPLASM OF BREAST: ICD-10-CM

## 2023-06-16 DIAGNOSIS — R59.0 REACTIVE CERVICAL LYMPHADENOPATHY: ICD-10-CM

## 2023-06-16 DIAGNOSIS — R79.89 ELEVATED LFTS: ICD-10-CM

## 2023-06-16 DIAGNOSIS — R73.03 PREDIABETES: ICD-10-CM

## 2023-06-16 LAB
BASOPHILS # BLD AUTO: 0.04 K/UL (ref 0–0.2)
BASOPHILS NFR BLD: 0.8 % (ref 0–1.9)
DIFFERENTIAL METHOD: ABNORMAL
EOSINOPHIL # BLD AUTO: 0.1 K/UL (ref 0–0.5)
EOSINOPHIL NFR BLD: 2.3 % (ref 0–8)
ERYTHROCYTE [DISTWIDTH] IN BLOOD BY AUTOMATED COUNT: 15.6 % (ref 11.5–14.5)
HCT VFR BLD AUTO: 41.7 % (ref 37–48.5)
HGB BLD-MCNC: 12.9 G/DL (ref 12–16)
IMM GRANULOCYTES # BLD AUTO: 0.01 K/UL (ref 0–0.04)
IMM GRANULOCYTES NFR BLD AUTO: 0.2 % (ref 0–0.5)
LYMPHOCYTES # BLD AUTO: 1.5 K/UL (ref 1–4.8)
LYMPHOCYTES NFR BLD: 31.5 % (ref 18–48)
MCH RBC QN AUTO: 26.9 PG (ref 27–31)
MCHC RBC AUTO-ENTMCNC: 30.9 G/DL (ref 32–36)
MCV RBC AUTO: 87 FL (ref 82–98)
MONOCYTES # BLD AUTO: 0.8 K/UL (ref 0.3–1)
MONOCYTES NFR BLD: 17.1 % (ref 4–15)
NEUTROPHILS # BLD AUTO: 2.3 K/UL (ref 1.8–7.7)
NEUTROPHILS NFR BLD: 48.1 % (ref 38–73)
NRBC BLD-RTO: 0 /100 WBC
PATH REV BLD -IMP: NORMAL
PLATELET # BLD AUTO: 256 K/UL (ref 150–450)
PMV BLD AUTO: 12.5 FL (ref 9.2–12.9)
RBC # BLD AUTO: 4.79 M/UL (ref 4–5.4)
WBC # BLD AUTO: 4.73 K/UL (ref 3.9–12.7)

## 2023-06-16 PROCEDURE — 3074F SYST BP LT 130 MM HG: CPT | Mod: CPTII,S$GLB,, | Performed by: FAMILY MEDICINE

## 2023-06-16 PROCEDURE — 85060 BLOOD SMEAR INTERPRETATION: CPT | Mod: ,,, | Performed by: PATHOLOGY

## 2023-06-16 PROCEDURE — 3078F DIAST BP <80 MM HG: CPT | Mod: CPTII,S$GLB,, | Performed by: FAMILY MEDICINE

## 2023-06-16 PROCEDURE — 85060 PATHOLOGIST REVIEW: ICD-10-PCS | Mod: ,,, | Performed by: PATHOLOGY

## 2023-06-16 PROCEDURE — 99214 PR OFFICE/OUTPT VISIT, EST, LEVL IV, 30-39 MIN: ICD-10-PCS | Mod: S$GLB,,, | Performed by: FAMILY MEDICINE

## 2023-06-16 PROCEDURE — 3074F PR MOST RECENT SYSTOLIC BLOOD PRESSURE < 130 MM HG: ICD-10-PCS | Mod: CPTII,S$GLB,, | Performed by: FAMILY MEDICINE

## 2023-06-16 PROCEDURE — 99999 PR PBB SHADOW E&M-EST. PATIENT-LVL IV: CPT | Mod: PBBFAC,,, | Performed by: FAMILY MEDICINE

## 2023-06-16 PROCEDURE — 3044F HG A1C LEVEL LT 7.0%: CPT | Mod: CPTII,S$GLB,, | Performed by: FAMILY MEDICINE

## 2023-06-16 PROCEDURE — 3078F PR MOST RECENT DIASTOLIC BLOOD PRESSURE < 80 MM HG: ICD-10-PCS | Mod: CPTII,S$GLB,, | Performed by: FAMILY MEDICINE

## 2023-06-16 PROCEDURE — 85025 COMPLETE CBC W/AUTO DIFF WBC: CPT | Performed by: FAMILY MEDICINE

## 2023-06-16 PROCEDURE — 99999 PR PBB SHADOW E&M-EST. PATIENT-LVL IV: ICD-10-PCS | Mod: PBBFAC,,, | Performed by: FAMILY MEDICINE

## 2023-06-16 PROCEDURE — 3008F BODY MASS INDEX DOCD: CPT | Mod: CPTII,S$GLB,, | Performed by: FAMILY MEDICINE

## 2023-06-16 PROCEDURE — 3044F PR MOST RECENT HEMOGLOBIN A1C LEVEL <7.0%: ICD-10-PCS | Mod: CPTII,S$GLB,, | Performed by: FAMILY MEDICINE

## 2023-06-16 PROCEDURE — 80076 HEPATIC FUNCTION PANEL: CPT | Performed by: FAMILY MEDICINE

## 2023-06-16 PROCEDURE — 3008F PR BODY MASS INDEX (BMI) DOCUMENTED: ICD-10-PCS | Mod: CPTII,S$GLB,, | Performed by: FAMILY MEDICINE

## 2023-06-16 PROCEDURE — 99214 OFFICE O/P EST MOD 30 MIN: CPT | Mod: S$GLB,,, | Performed by: FAMILY MEDICINE

## 2023-06-16 PROCEDURE — 36415 COLL VENOUS BLD VENIPUNCTURE: CPT | Mod: PO | Performed by: FAMILY MEDICINE

## 2023-06-16 RX ORDER — METFORMIN HYDROCHLORIDE 500 MG/1
500 TABLET, EXTENDED RELEASE ORAL NIGHTLY
Qty: 90 TABLET | Refills: 3 | Status: SHIPPED | OUTPATIENT
Start: 2023-06-16 | End: 2023-07-28

## 2023-06-16 NOTE — PROGRESS NOTES
Subjective:       Patient ID: So Winkler is a 48 y.o. female.    Chief Complaint: KNOT IN NECK    Patient Active Problem List   Diagnosis    Class 3 severe obesity due to excess calories with serious comorbidity and body mass index (BMI) of 40.0 to 44.9 in adult    Prediabetes    Family history of diabetes mellitus    Radial scar of breast    Elevated LFTs    Low HDL (under 40)      HPI  47 yo female presents today for persistent tender and large LN on right side of neck with no clear sign of infection    Review of Systems   All other systems reviewed and are negative.       Objective:     Vitals:    06/16/23 1256   BP: 118/60   Pulse: (!) 53     Physical Exam  Vitals and nursing note reviewed.   Constitutional:       General: She is not in acute distress.     Appearance: Normal appearance. She is well-developed. She is not ill-appearing, toxic-appearing or diaphoretic.   HENT:      Head: Normocephalic and atraumatic.      Comments: TM: appear normal BL  Nasal congestion noted   No pharyngeal erythema   Full ROM of neck  Right neck with LAD, tender 2cm, cervical chain    Eyes:      General: No scleral icterus.     Conjunctiva/sclera: Conjunctivae normal.      Pupils: Pupils are equal, round, and reactive to light.   Cardiovascular:      Rate and Rhythm: Normal rate and regular rhythm.   Pulmonary:      Effort: Pulmonary effort is normal. No respiratory distress.      Breath sounds: Normal breath sounds.   Abdominal:      Palpations: Abdomen is soft.      Tenderness: There is no abdominal tenderness.   Musculoskeletal:      Cervical back: Normal range of motion and neck supple.   Skin:     General: Skin is warm.      Coloration: Skin is not pale.      Findings: No rash.   Neurological:      Mental Status: She is alert and oriented to person, place, and time. Mental status is at baseline.   Psychiatric:         Attention and Perception: Attention and perception normal.         Mood and Affect: Mood and  affect normal.         Speech: Speech normal.         Behavior: Behavior normal.         Thought Content: Thought content normal.         Cognition and Memory: Cognition and memory normal.         Judgment: Judgment normal.     Assessment:       1. Enlarged lymph node in neck    2. Reactive cervical lymphadenopathy    3. Elevated LFTs    4. Encounter for screening mammogram for malignant neoplasm of breast    5. Prediabetes    6. Genital warts due to HPV (human papillomavirus)        Plan:   Recent relevant labs results reviewed with patient.       1. Enlarged lymph node in neck  -     US Soft Tissue Head Neck Thyroid; Future; Expected date: 06/16/2023  -     CBC Auto Differential; Future; Expected date: 06/16/2023  -     Pathologist Interpretation Differential; Future; Expected date: 06/16/2023    2. Reactive cervical lymphadenopathy  Comments:  Right cervical neck  Orders:  -     US Soft Tissue Head Neck Thyroid; Future; Expected date: 06/16/2023  -     CBC Auto Differential; Future; Expected date: 06/16/2023  -     Pathologist Interpretation Differential; Future; Expected date: 06/16/2023    3. Elevated LFTs  -     Hepatic Function Panel; Future; Expected date: 06/16/2023    4. Encounter for screening mammogram for malignant neoplasm of breast  Comments:  Scheduled    5. Prediabetes  -     metFORMIN (GLUCOPHAGE-XR) 500 MG ER 24hr tablet; Take 1 tablet (500 mg total) by mouth every evening.  Dispense: 90 tablet; Refill: 3    6. Genital warts due to HPV (human papillomavirus)  -     Ambulatory referral/consult to Obstetrics / Gynecology; Future; Expected date: 06/23/2023      Patient's questions answered. Plan reviewed with patient at the end of visit. Relevant precautions to chief complaint and reasons to seek further medical care or to contact the office sooner reviewed with patient.     Follow up if symptoms worsen or fail to improve.

## 2023-06-17 LAB
ALBUMIN SERPL BCP-MCNC: 3.3 G/DL (ref 3.5–5.2)
ALP SERPL-CCNC: 74 U/L (ref 55–135)
ALT SERPL W/O P-5'-P-CCNC: 22 U/L (ref 10–44)
AST SERPL-CCNC: 21 U/L (ref 10–40)
BILIRUB DIRECT SERPL-MCNC: 0.2 MG/DL (ref 0.1–0.3)
BILIRUB SERPL-MCNC: 0.5 MG/DL (ref 0.1–1)
PROT SERPL-MCNC: 7 G/DL (ref 6–8.4)

## 2023-06-19 LAB — PATH REV BLD -IMP: NORMAL
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
## 2023-06-21 ENCOUNTER — HOSPITAL ENCOUNTER (OUTPATIENT)
Dept: RADIOLOGY | Facility: HOSPITAL | Age: 48
Discharge: HOME | End: 2023-06-21
Attending: FAMILY MEDICINE
Payer: COMMERCIAL

## 2023-06-21 DIAGNOSIS — R59.0 ENLARGED LYMPH NODE IN NECK: ICD-10-CM

## 2023-06-21 DIAGNOSIS — R59.0 REACTIVE CERVICAL LYMPHADENOPATHY: ICD-10-CM

## 2023-06-21 PROCEDURE — 76536 US EXAM OF HEAD AND NECK: CPT | Mod: 26,,, | Performed by: RADIOLOGY

## 2023-06-21 PROCEDURE — 76536 US SOFT TISSUE HEAD NECK THYROID: ICD-10-PCS | Mod: 26,,, | Performed by: RADIOLOGY

## 2023-06-21 PROCEDURE — 76536 US EXAM OF HEAD AND NECK: CPT | Mod: TC,PO

## 2023-06-22 ENCOUNTER — OFFICE VISIT (OUTPATIENT)
Dept: OBSTETRICS AND GYNECOLOGY | Facility: CLINIC | Age: 48
End: 2023-06-22
Payer: COMMERCIAL

## 2023-06-22 ENCOUNTER — TELEPHONE (OUTPATIENT)
Dept: FAMILY MEDICINE | Facility: CLINIC | Age: 48
End: 2023-06-22
Payer: COMMERCIAL

## 2023-06-22 ENCOUNTER — PATIENT MESSAGE (OUTPATIENT)
Dept: FAMILY MEDICINE | Facility: CLINIC | Age: 48
End: 2023-06-22
Payer: COMMERCIAL

## 2023-06-22 VITALS
DIASTOLIC BLOOD PRESSURE: 83 MMHG | SYSTOLIC BLOOD PRESSURE: 151 MMHG | BODY MASS INDEX: 42.38 KG/M2 | WEIGHT: 263.69 LBS | HEIGHT: 66 IN

## 2023-06-22 DIAGNOSIS — A63.0 GENITAL WARTS DUE TO HPV (HUMAN PAPILLOMAVIRUS): Primary | ICD-10-CM

## 2023-06-22 DIAGNOSIS — T78.40XD ALLERGIC REACTION, SUBSEQUENT ENCOUNTER: Primary | ICD-10-CM

## 2023-06-22 PROCEDURE — 99203 PR OFFICE/OUTPT VISIT, NEW, LEVL III, 30-44 MIN: ICD-10-PCS | Mod: 25,S$GLB,,

## 2023-06-22 PROCEDURE — 3008F PR BODY MASS INDEX (BMI) DOCUMENTED: ICD-10-PCS | Mod: CPTII,S$GLB,,

## 2023-06-22 PROCEDURE — 56501 DESTROY VULVA LESIONS SIM: CPT | Mod: S$GLB,,,

## 2023-06-22 PROCEDURE — 1160F RVW MEDS BY RX/DR IN RCRD: CPT | Mod: CPTII,S$GLB,,

## 2023-06-22 PROCEDURE — 3077F PR MOST RECENT SYSTOLIC BLOOD PRESSURE >= 140 MM HG: ICD-10-PCS | Mod: CPTII,S$GLB,,

## 2023-06-22 PROCEDURE — 99999 PR PBB SHADOW E&M-EST. PATIENT-LVL III: CPT | Mod: PBBFAC,,,

## 2023-06-22 PROCEDURE — 99203 OFFICE O/P NEW LOW 30 MIN: CPT | Mod: 25,S$GLB,,

## 2023-06-22 PROCEDURE — 56501 PR DESTRUCTION,LESION(S),VULVA,SIMPLE: ICD-10-PCS | Mod: S$GLB,,,

## 2023-06-22 PROCEDURE — 3079F PR MOST RECENT DIASTOLIC BLOOD PRESSURE 80-89 MM HG: ICD-10-PCS | Mod: CPTII,S$GLB,,

## 2023-06-22 PROCEDURE — 3077F SYST BP >= 140 MM HG: CPT | Mod: CPTII,S$GLB,,

## 2023-06-22 PROCEDURE — 3079F DIAST BP 80-89 MM HG: CPT | Mod: CPTII,S$GLB,,

## 2023-06-22 PROCEDURE — 3008F BODY MASS INDEX DOCD: CPT | Mod: CPTII,S$GLB,,

## 2023-06-22 PROCEDURE — 99999 PR PBB SHADOW E&M-EST. PATIENT-LVL III: ICD-10-PCS | Mod: PBBFAC,,,

## 2023-06-22 PROCEDURE — 3044F PR MOST RECENT HEMOGLOBIN A1C LEVEL <7.0%: ICD-10-PCS | Mod: CPTII,S$GLB,,

## 2023-06-22 PROCEDURE — 1159F MED LIST DOCD IN RCRD: CPT | Mod: CPTII,S$GLB,,

## 2023-06-22 PROCEDURE — 1160F PR REVIEW ALL MEDS BY PRESCRIBER/CLIN PHARMACIST DOCUMENTED: ICD-10-PCS | Mod: CPTII,S$GLB,,

## 2023-06-22 PROCEDURE — 1159F PR MEDICATION LIST DOCUMENTED IN MEDICAL RECORD: ICD-10-PCS | Mod: CPTII,S$GLB,,

## 2023-06-22 PROCEDURE — 3044F HG A1C LEVEL LT 7.0%: CPT | Mod: CPTII,S$GLB,,

## 2023-06-22 NOTE — TELEPHONE ENCOUNTER
Patient instructed on the following:    Benadryl 25mg q 6-8 hrs.   Pepcid 20 mg po BID  Do this for next 5 days if tolerable.      Allergy referral if above does not work     Denies any new exposures.    Take photos of rash now, as progresses, and send to us via your [facility] application.     US results reviewed, unremarkable.     Verbalized understanding.

## 2023-06-22 NOTE — TELEPHONE ENCOUNTER
Benadryl 25mg q 6-8 hrs.   Pepcid 20 mg po BID  Do this for next 5 days if tolerable.     Allergy referral.     New exposures?  Take photos of rash now, as progresses.     US results reviewed, unremarkable.

## 2023-06-27 ENCOUNTER — TELEPHONE (OUTPATIENT)
Dept: OBSTETRICS AND GYNECOLOGY | Facility: CLINIC | Age: 48
End: 2023-06-27
Payer: COMMERCIAL

## 2023-06-27 ENCOUNTER — PATIENT MESSAGE (OUTPATIENT)
Dept: OBSTETRICS AND GYNECOLOGY | Facility: CLINIC | Age: 48
End: 2023-06-27
Payer: COMMERCIAL

## 2023-06-27 ENCOUNTER — HOSPITAL ENCOUNTER (OUTPATIENT)
Dept: RADIOLOGY | Facility: HOSPITAL | Age: 48
Discharge: HOME | End: 2023-06-27
Attending: SURGERY
Payer: COMMERCIAL

## 2023-06-27 ENCOUNTER — OFFICE VISIT (OUTPATIENT)
Dept: SURGERY | Facility: CLINIC | Age: 48
End: 2023-06-27
Payer: COMMERCIAL

## 2023-06-27 VITALS — WEIGHT: 263 LBS | BODY MASS INDEX: 42.27 KG/M2 | HEIGHT: 66 IN

## 2023-06-27 DIAGNOSIS — N64.89 RADIAL SCAR OF BREAST: ICD-10-CM

## 2023-06-27 DIAGNOSIS — Z98.890 STATUS POST EXCISIONAL BIOPSY: ICD-10-CM

## 2023-06-27 DIAGNOSIS — Z12.31 SCREENING MAMMOGRAM, ENCOUNTER FOR: Primary | ICD-10-CM

## 2023-06-27 DIAGNOSIS — Z12.31 SCREENING MAMMOGRAM, ENCOUNTER FOR: ICD-10-CM

## 2023-06-27 PROCEDURE — 99213 OFFICE O/P EST LOW 20 MIN: CPT | Mod: S$GLB,,, | Performed by: PHYSICIAN ASSISTANT

## 2023-06-27 PROCEDURE — 77067 SCR MAMMO BI INCL CAD: CPT | Mod: TC

## 2023-06-27 PROCEDURE — 99213 PR OFFICE/OUTPT VISIT, EST, LEVL III, 20-29 MIN: ICD-10-PCS | Mod: S$GLB,,, | Performed by: PHYSICIAN ASSISTANT

## 2023-06-27 PROCEDURE — 3008F PR BODY MASS INDEX (BMI) DOCUMENTED: ICD-10-PCS | Mod: CPTII,S$GLB,, | Performed by: PHYSICIAN ASSISTANT

## 2023-06-27 PROCEDURE — 3044F HG A1C LEVEL LT 7.0%: CPT | Mod: CPTII,S$GLB,, | Performed by: PHYSICIAN ASSISTANT

## 2023-06-27 PROCEDURE — 99999 PR PBB SHADOW E&M-EST. PATIENT-LVL III: ICD-10-PCS | Mod: PBBFAC,,, | Performed by: PHYSICIAN ASSISTANT

## 2023-06-27 PROCEDURE — 1159F PR MEDICATION LIST DOCUMENTED IN MEDICAL RECORD: ICD-10-PCS | Mod: CPTII,S$GLB,, | Performed by: PHYSICIAN ASSISTANT

## 2023-06-27 PROCEDURE — 99999 PR PBB SHADOW E&M-EST. PATIENT-LVL III: CPT | Mod: PBBFAC,,, | Performed by: PHYSICIAN ASSISTANT

## 2023-06-27 PROCEDURE — 77063 MAMMO DIGITAL SCREENING BILAT WITH TOMO: ICD-10-PCS | Mod: 26,,, | Performed by: RADIOLOGY

## 2023-06-27 PROCEDURE — 77067 MAMMO DIGITAL SCREENING BILAT WITH TOMO: ICD-10-PCS | Mod: 26,,, | Performed by: RADIOLOGY

## 2023-06-27 PROCEDURE — 3044F PR MOST RECENT HEMOGLOBIN A1C LEVEL <7.0%: ICD-10-PCS | Mod: CPTII,S$GLB,, | Performed by: PHYSICIAN ASSISTANT

## 2023-06-27 PROCEDURE — 3008F BODY MASS INDEX DOCD: CPT | Mod: CPTII,S$GLB,, | Performed by: PHYSICIAN ASSISTANT

## 2023-06-27 PROCEDURE — 77063 BREAST TOMOSYNTHESIS BI: CPT | Mod: 26,,, | Performed by: RADIOLOGY

## 2023-06-27 PROCEDURE — 1160F PR REVIEW ALL MEDS BY PRESCRIBER/CLIN PHARMACIST DOCUMENTED: ICD-10-PCS | Mod: CPTII,S$GLB,, | Performed by: PHYSICIAN ASSISTANT

## 2023-06-27 PROCEDURE — 77067 SCR MAMMO BI INCL CAD: CPT | Mod: 26,,, | Performed by: RADIOLOGY

## 2023-06-27 PROCEDURE — 1160F RVW MEDS BY RX/DR IN RCRD: CPT | Mod: CPTII,S$GLB,, | Performed by: PHYSICIAN ASSISTANT

## 2023-06-27 PROCEDURE — 1159F MED LIST DOCD IN RCRD: CPT | Mod: CPTII,S$GLB,, | Performed by: PHYSICIAN ASSISTANT

## 2023-06-27 NOTE — TELEPHONE ENCOUNTER
----- Message from Joann Kim sent at 6/27/2023 12:04 PM CDT -----  Type:  Needs Medical Advice    Who Called: pt   Symptoms (please be specific): reaction to substance used during procedure   Burning / hurts to pee  How long has patient had these symptoms:  after procedure   Would the patient rather a call back or a response via [facility]? Call   Best Call Back Number: [phone]  Additional Information:     Please be advised pt left msgs on mychart/ called office and no one has respond

## 2023-06-27 NOTE — PROGRESS NOTES
"[facility]  Department of Surgery    REFERRING PROVIDER: Sissy Zazueta MD  CHIEF COMPLAINT:   No chief complaint on file.      DIAGNOSIS:   This is a 48 y.o. female with a history of radial scar of the left breast s/p excisional biopsy on 6/3/22 complicated by late hematoma.     HISTORY OF PRESENT ILLNESS:   So Winkler is a 48 y.o. female comes in for routine follow up CBE with mammogram. Area of concern of previous hematoma continues to soften. She denies change in her breast self-exam specifically denying new masses, skin or nipple changes, or nipple discharge.The patient denies constitutional symptoms of night sweats, chills, weight loss, new headaches, visual changes, new back or bony pain, chest pain, or shortness of breath.        Review of Systems: See HPI/Interval History for other systems reviewed.     IMAGIN/27/23 Bilateral Screening Mammo: report pending          MEDICATIONS/ALLERGIES:     Current Outpatient Medications   Medication Sig    hydrOXYzine HCL (ATARAX) 25 MG tablet Take 1 tablet (25 mg total) by mouth 3 (three) times daily as needed for Itching. For allergy reaction (Patient not taking: Reported on 2023)    imiquimod (ALDARA) 5 % cream Apply a thin layer 3 times per week (on alternate days) prior to bedtime; leave on skin for 6 to 10 hours, then remove with mild soap and water.    metFORMIN (GLUCOPHAGE-XR) 500 MG ER 24hr tablet Take 1 tablet (500 mg total) by mouth every evening.     No current facility-administered medications for this visit.      Review of patient's allergies indicates:   Allergen Reactions    Boostrix tdap [diphth,pertus(acell),tetanus] Rash     Patient with nodules under skin in areas of tattoo day after Tdap vaccine.     Zosyn [piperacillin-tazobactam] Rash       PHYSICAL EXAM:   Ht 5' 6" (1.676 m)   Wt 119.3 kg (263 lb)   LMP  (LMP Unknown)   BMI 42.45 kg/m²     Physical Exam   Vitals reviewed.  Constitutional: She is oriented to person, " place, and time.   HENT:   Head: Normocephalic and atraumatic.   Nose: Nose normal.   Eyes: Pupils are equal, round, and reactive to light. Right eye exhibits no discharge. Left eye exhibits no discharge.   Pulmonary/Chest: Effort normal and breath sounds normal. No stridor. No respiratory distress. She exhibits no mass, no tenderness and no edema. Right breast exhibits no inverted nipple, no mass, no nipple discharge, no skin change and no tenderness. Left breast exhibits no inverted nipple, no mass, no nipple discharge, no skin change and no tenderness. No breast swelling or bleeding. Breasts are symmetrical.       Abdominal: Normal appearance.   Genitourinary: No breast swelling or bleeding.   Neurological: She is alert and oriented to person, place, and time.   Skin: Skin is warm and dry.     Psychiatric: Her behavior is normal. Mood, judgment and thought content normal.     ASSESSMENT:   This is a 48 y.o. female without evidence of recurrence by exam, history or imaging.       PLAN:   1. Continued improvement noted on exam.   2. Continue monthly self breast exams and call the clinic with any changes or problems.  3. Mammogram in June 2024.  4. Follow up PRN.     The patient is in agreement with the plan. Questions were encouraged and answered to patient's satisfaction. So will call our office with any questions or concerns.       Total time spent with the patient: 20.  10 minutes of face to face consultation and 10 minutes of chart review and coordination of care.

## 2023-06-27 NOTE — TELEPHONE ENCOUNTER
Spoke to pt and she informs us that the substance used to have wart fall off has caused her lots of irritations after visit- solution dripped around vaginal area all the way to anus area-now it hurts to take a shower and touch area, wears two panty liners due to brownish like discharge-irritation both internal and outer. Please advise

## 2023-06-28 ENCOUNTER — PATIENT MESSAGE (OUTPATIENT)
Dept: OBSTETRICS AND GYNECOLOGY | Facility: CLINIC | Age: 48
End: 2023-06-28
Payer: COMMERCIAL

## 2023-06-28 RX ORDER — SILVER SULFADIAZINE 10 G/1000G
CREAM TOPICAL
Qty: 25 G | Refills: 1 | Status: SHIPPED | OUTPATIENT
Start: 2023-06-28 | End: 2023-09-19

## 2023-07-28 ENCOUNTER — PATIENT MESSAGE (OUTPATIENT)
Dept: FAMILY MEDICINE | Facility: CLINIC | Age: 48
End: 2023-07-28

## 2023-07-28 ENCOUNTER — TELEPHONE (OUTPATIENT)
Dept: PHARMACY | Facility: CLINIC | Age: 48
End: 2023-07-28
Payer: COMMERCIAL

## 2023-07-28 ENCOUNTER — OFFICE VISIT (OUTPATIENT)
Dept: FAMILY MEDICINE | Facility: CLINIC | Age: 48
End: 2023-07-28
Payer: COMMERCIAL

## 2023-07-28 VITALS
WEIGHT: 247.38 LBS | BODY MASS INDEX: 39.76 KG/M2 | HEIGHT: 66 IN | HEART RATE: 68 BPM | SYSTOLIC BLOOD PRESSURE: 118 MMHG | DIASTOLIC BLOOD PRESSURE: 68 MMHG | OXYGEN SATURATION: 98 %

## 2023-07-28 DIAGNOSIS — E78.6 LOW HDL (UNDER 40): ICD-10-CM

## 2023-07-28 DIAGNOSIS — R73.03 PREDIABETES: Primary | ICD-10-CM

## 2023-07-28 DIAGNOSIS — E66.1 CLASS 2 DRUG-INDUCED OBESITY WITH SERIOUS COMORBIDITY AND BODY MASS INDEX (BMI) OF 39.0 TO 39.9 IN ADULT: ICD-10-CM

## 2023-07-28 DIAGNOSIS — R79.89 ELEVATED LFTS: ICD-10-CM

## 2023-07-28 DIAGNOSIS — Z83.3 FAMILY HISTORY OF DIABETES MELLITUS: ICD-10-CM

## 2023-07-28 DIAGNOSIS — R63.8 UNABLE TO LOSE WEIGHT: ICD-10-CM

## 2023-07-28 PROBLEM — E66.812 CLASS 2 DRUG-INDUCED OBESITY WITH SERIOUS COMORBIDITY AND BODY MASS INDEX (BMI) OF 39.0 TO 39.9 IN ADULT: Status: ACTIVE | Noted: 2022-02-25

## 2023-07-28 PROCEDURE — 3078F PR MOST RECENT DIASTOLIC BLOOD PRESSURE < 80 MM HG: ICD-10-PCS | Mod: CPTII,S$GLB,, | Performed by: FAMILY MEDICINE

## 2023-07-28 PROCEDURE — 3008F PR BODY MASS INDEX (BMI) DOCUMENTED: ICD-10-PCS | Mod: CPTII,S$GLB,, | Performed by: FAMILY MEDICINE

## 2023-07-28 PROCEDURE — 3008F BODY MASS INDEX DOCD: CPT | Mod: CPTII,S$GLB,, | Performed by: FAMILY MEDICINE

## 2023-07-28 PROCEDURE — 3044F HG A1C LEVEL LT 7.0%: CPT | Mod: CPTII,S$GLB,, | Performed by: FAMILY MEDICINE

## 2023-07-28 PROCEDURE — 99213 OFFICE O/P EST LOW 20 MIN: CPT | Mod: S$GLB,,, | Performed by: FAMILY MEDICINE

## 2023-07-28 PROCEDURE — 1159F MED LIST DOCD IN RCRD: CPT | Mod: CPTII,S$GLB,, | Performed by: FAMILY MEDICINE

## 2023-07-28 PROCEDURE — 3078F DIAST BP <80 MM HG: CPT | Mod: CPTII,S$GLB,, | Performed by: FAMILY MEDICINE

## 2023-07-28 PROCEDURE — 99213 PR OFFICE/OUTPT VISIT, EST, LEVL III, 20-29 MIN: ICD-10-PCS | Mod: S$GLB,,, | Performed by: FAMILY MEDICINE

## 2023-07-28 PROCEDURE — 1159F PR MEDICATION LIST DOCUMENTED IN MEDICAL RECORD: ICD-10-PCS | Mod: CPTII,S$GLB,, | Performed by: FAMILY MEDICINE

## 2023-07-28 PROCEDURE — 1160F PR REVIEW ALL MEDS BY PRESCRIBER/CLIN PHARMACIST DOCUMENTED: ICD-10-PCS | Mod: CPTII,S$GLB,, | Performed by: FAMILY MEDICINE

## 2023-07-28 PROCEDURE — 99999 PR PBB SHADOW E&M-EST. PATIENT-LVL III: ICD-10-PCS | Mod: PBBFAC,,, | Performed by: FAMILY MEDICINE

## 2023-07-28 PROCEDURE — 99999 PR PBB SHADOW E&M-EST. PATIENT-LVL III: CPT | Mod: PBBFAC,,, | Performed by: FAMILY MEDICINE

## 2023-07-28 PROCEDURE — 3074F SYST BP LT 130 MM HG: CPT | Mod: CPTII,S$GLB,, | Performed by: FAMILY MEDICINE

## 2023-07-28 PROCEDURE — 3044F PR MOST RECENT HEMOGLOBIN A1C LEVEL <7.0%: ICD-10-PCS | Mod: CPTII,S$GLB,, | Performed by: FAMILY MEDICINE

## 2023-07-28 PROCEDURE — 3074F PR MOST RECENT SYSTOLIC BLOOD PRESSURE < 130 MM HG: ICD-10-PCS | Mod: CPTII,S$GLB,, | Performed by: FAMILY MEDICINE

## 2023-07-28 PROCEDURE — 1160F RVW MEDS BY RX/DR IN RCRD: CPT | Mod: CPTII,S$GLB,, | Performed by: FAMILY MEDICINE

## 2023-07-28 RX ORDER — SEMAGLUTIDE 1.34 MG/ML
1 INJECTION, SOLUTION SUBCUTANEOUS
Refills: 0
Start: 2023-07-28 | End: 2023-09-19

## 2023-07-28 NOTE — TELEPHONE ENCOUNTER
Unfortunately, The Pharmacy Patient Assistance Team is unable to assist Ms. Winkler at this time due to the following reasons      Patient has Private/Commercial Insurance

## 2023-07-28 NOTE — PROGRESS NOTES
Subjective:       Patient ID: So Winkler is a 48 y.o. female.    Chief Complaint: Weight Check    Patient Active Problem List   Diagnosis    Class 2 drug-induced obesity with serious comorbidity and body mass index (BMI) of 39.0 to 39.9 in adult    Prediabetes    Family history of diabetes mellitus    Radial scar of breast    Elevated LFTs    Low HDL (under 40)      HPI  49 yo female working on weight loss  Able to access supply of trulicity 1.5mg weekly. Used, tolerated well, noted appetite suppression and weight loss, increased energy and feeling better overall.   Unfortunately not covered by insurance without Type II DM dx.     Review of Systems   Constitutional:  Positive for unexpected weight change. Negative for activity change.   HENT:  Negative for hearing loss, rhinorrhea and trouble swallowing.    Eyes:  Negative for discharge and visual disturbance.   Respiratory:  Negative for chest tightness and wheezing.    Cardiovascular:  Negative for chest pain and palpitations.   Gastrointestinal:  Negative for blood in stool, constipation, diarrhea and vomiting.   Endocrine: Negative for polydipsia and polyuria.   Genitourinary:  Negative for difficulty urinating, dysuria, hematuria and menstrual problem.   Musculoskeletal:  Negative for arthralgias, joint swelling and neck pain.   Neurological:  Negative for weakness and headaches.   Psychiatric/Behavioral:  Negative for confusion and dysphoric mood.    All other systems reviewed and are negative.       Objective:     Vitals:    07/28/23 1319   BP: 118/68   Pulse: 68     Physical Exam  Vitals and nursing note reviewed.   Constitutional:       General: She is not in acute distress.     Appearance: Normal appearance. She is obese. She is not ill-appearing, toxic-appearing or diaphoretic.   HENT:      Head: Normocephalic and atraumatic.   Eyes:      General: No scleral icterus.     Conjunctiva/sclera: Conjunctivae normal.   Cardiovascular:      Rate and  Rhythm: Normal rate.   Pulmonary:      Effort: Pulmonary effort is normal. No respiratory distress.   Skin:     Coloration: Skin is not pale.   Neurological:      Mental Status: She is alert. Mental status is at baseline.   Psychiatric:         Attention and Perception: Attention and perception normal.         Mood and Affect: Mood and affect normal.         Speech: Speech normal.         Behavior: Behavior normal.         Cognition and Memory: Cognition and memory normal.         Judgment: Judgment normal.       Assessment:       1. Prediabetes    2. Family history of diabetes mellitus    3. Low HDL (under 40)    4. Elevated LFTs    5. Class 2 drug-induced obesity with serious comorbidity and body mass index (BMI) of 39.0 to 39.9 in adult    6. Unable to lose weight        Plan:   Recent relevant labs results reviewed with patient.   LFTs normalized, resolved elevation  PreDM likely improved with weight loss.   Progress. Continue lifestyle modifications. Will see if she qualifies for pharmacy assistance.   Weight loss would continue to greatly improve her overall health and aid in control and resolution of her health comorbidities.         1. Prediabetes  -     Ambulatory referral/consult to Pharmacy Assistance; Future; Expected date: 08/04/2023  -     semaglutide (OZEMPIC) 1 mg/dose (4 mg/3 mL); Inject 1 mg into the skin every 7 days.; Refill: 0    2. Family history of diabetes mellitus  -     Ambulatory referral/consult to Pharmacy Assistance; Future; Expected date: 08/04/2023  -     semaglutide (OZEMPIC) 1 mg/dose (4 mg/3 mL); Inject 1 mg into the skin every 7 days.; Refill: 0    3. Low HDL (under 40)    4. Elevated LFTs    5. Class 2 drug-induced obesity with serious comorbidity and body mass index (BMI) of 39.0 to 39.9 in adult  -     Ambulatory referral/consult to Pharmacy Assistance; Future; Expected date: 08/04/2023  -     semaglutide (OZEMPIC) 1 mg/dose (4 mg/3 mL); Inject 1 mg into the skin every 7 days.;  Refill: 0    6. Unable to lose weight  -     semaglutide (OZEMPIC) 1 mg/dose (4 mg/3 mL); Inject 1 mg into the skin every 7 days.; Refill: 0      Patient's questions answered. Plan reviewed with patient at the end of visit. Relevant precautions to chief complaint and reasons to seek further medical care or to contact the office sooner reviewed with patient.     Follow up in about 7 months (around 2/28/2024) for Annual Exam.

## 2023-08-14 ENCOUNTER — PATIENT MESSAGE (OUTPATIENT)
Dept: FAMILY MEDICINE | Facility: CLINIC | Age: 48
End: 2023-08-14
Payer: COMMERCIAL

## 2023-08-17 ENCOUNTER — PATIENT MESSAGE (OUTPATIENT)
Dept: FAMILY MEDICINE | Facility: CLINIC | Age: 48
End: 2023-08-17
Payer: COMMERCIAL

## 2023-09-19 ENCOUNTER — OFFICE VISIT (OUTPATIENT)
Dept: FAMILY MEDICINE | Facility: CLINIC | Age: 48
End: 2023-09-19
Payer: COMMERCIAL

## 2023-09-19 VITALS
OXYGEN SATURATION: 99 % | DIASTOLIC BLOOD PRESSURE: 74 MMHG | WEIGHT: 252.44 LBS | SYSTOLIC BLOOD PRESSURE: 126 MMHG | HEIGHT: 66 IN | BODY MASS INDEX: 40.57 KG/M2 | HEART RATE: 68 BPM

## 2023-09-19 DIAGNOSIS — Z83.3 FAMILY HISTORY OF DIABETES MELLITUS: ICD-10-CM

## 2023-09-19 DIAGNOSIS — R21 RASH DUE TO ALLERGY: Primary | ICD-10-CM

## 2023-09-19 DIAGNOSIS — R73.03 PREDIABETES: ICD-10-CM

## 2023-09-19 DIAGNOSIS — T78.40XA RASH DUE TO ALLERGY: Primary | ICD-10-CM

## 2023-09-19 DIAGNOSIS — E78.6 LOW HDL (UNDER 40): ICD-10-CM

## 2023-09-19 DIAGNOSIS — R59.0 ENLARGED LYMPH NODE IN NECK: ICD-10-CM

## 2023-09-19 DIAGNOSIS — E66.1 CLASS 3 DRUG-INDUCED OBESITY WITH SERIOUS COMORBIDITY AND BODY MASS INDEX (BMI) OF 40.0 TO 44.9 IN ADULT: ICD-10-CM

## 2023-09-19 DIAGNOSIS — R63.8 UNABLE TO LOSE WEIGHT: ICD-10-CM

## 2023-09-19 DIAGNOSIS — T78.40XD ALLERGIC REACTION, SUBSEQUENT ENCOUNTER: ICD-10-CM

## 2023-09-19 PROCEDURE — 1160F RVW MEDS BY RX/DR IN RCRD: CPT | Mod: CPTII,S$GLB,, | Performed by: FAMILY MEDICINE

## 2023-09-19 PROCEDURE — 3044F HG A1C LEVEL LT 7.0%: CPT | Mod: CPTII,S$GLB,, | Performed by: FAMILY MEDICINE

## 2023-09-19 PROCEDURE — 99999 PR PBB SHADOW E&M-EST. PATIENT-LVL V: ICD-10-PCS | Mod: PBBFAC,,, | Performed by: FAMILY MEDICINE

## 2023-09-19 PROCEDURE — 1160F PR REVIEW ALL MEDS BY PRESCRIBER/CLIN PHARMACIST DOCUMENTED: ICD-10-PCS | Mod: CPTII,S$GLB,, | Performed by: FAMILY MEDICINE

## 2023-09-19 PROCEDURE — 3008F PR BODY MASS INDEX (BMI) DOCUMENTED: ICD-10-PCS | Mod: CPTII,S$GLB,, | Performed by: FAMILY MEDICINE

## 2023-09-19 PROCEDURE — 3044F PR MOST RECENT HEMOGLOBIN A1C LEVEL <7.0%: ICD-10-PCS | Mod: CPTII,S$GLB,, | Performed by: FAMILY MEDICINE

## 2023-09-19 PROCEDURE — 3074F PR MOST RECENT SYSTOLIC BLOOD PRESSURE < 130 MM HG: ICD-10-PCS | Mod: CPTII,S$GLB,, | Performed by: FAMILY MEDICINE

## 2023-09-19 PROCEDURE — 3074F SYST BP LT 130 MM HG: CPT | Mod: CPTII,S$GLB,, | Performed by: FAMILY MEDICINE

## 2023-09-19 PROCEDURE — 1159F MED LIST DOCD IN RCRD: CPT | Mod: CPTII,S$GLB,, | Performed by: FAMILY MEDICINE

## 2023-09-19 PROCEDURE — 99213 OFFICE O/P EST LOW 20 MIN: CPT | Mod: S$GLB,,, | Performed by: FAMILY MEDICINE

## 2023-09-19 PROCEDURE — 3078F DIAST BP <80 MM HG: CPT | Mod: CPTII,S$GLB,, | Performed by: FAMILY MEDICINE

## 2023-09-19 PROCEDURE — 3078F PR MOST RECENT DIASTOLIC BLOOD PRESSURE < 80 MM HG: ICD-10-PCS | Mod: CPTII,S$GLB,, | Performed by: FAMILY MEDICINE

## 2023-09-19 PROCEDURE — 99999 PR PBB SHADOW E&M-EST. PATIENT-LVL V: CPT | Mod: PBBFAC,,, | Performed by: FAMILY MEDICINE

## 2023-09-19 PROCEDURE — 1159F PR MEDICATION LIST DOCUMENTED IN MEDICAL RECORD: ICD-10-PCS | Mod: CPTII,S$GLB,, | Performed by: FAMILY MEDICINE

## 2023-09-19 PROCEDURE — 99213 PR OFFICE/OUTPT VISIT, EST, LEVL III, 20-29 MIN: ICD-10-PCS | Mod: S$GLB,,, | Performed by: FAMILY MEDICINE

## 2023-09-19 PROCEDURE — 3008F BODY MASS INDEX DOCD: CPT | Mod: CPTII,S$GLB,, | Performed by: FAMILY MEDICINE

## 2023-09-19 RX ORDER — PHENTERMINE HYDROCHLORIDE 37.5 MG/1
37.5 TABLET ORAL
Qty: 90 TABLET | Refills: 0 | Status: SHIPPED | OUTPATIENT
Start: 2023-09-19 | End: 2023-12-01

## 2023-09-19 NOTE — PATIENT INSTRUCTIONS
[facility] - please call to schedule initial intake appointment  Call Office [phone]

## 2023-09-19 NOTE — PROGRESS NOTES
Subjective:       Patient ID: So Winkler is a 48 y.o. female.    Chief Complaint: Weight Check    Patient Active Problem List   Diagnosis    Class 3 drug-induced obesity with serious comorbidity and body mass index (BMI) of 40.0 to 44.9 in adult    Prediabetes    Family history of diabetes mellitus    Radial scar of breast    Low HDL (under 40)      HPI  49 yo female presents today for follow up on weight. Unable to get GLP1a covered, she used coworker's Trulicity and noted positive response.     Recently with recurrence of itching over tattoos with local inflammation.   Right cervical LN resolved and now returned. Prior US without concerning findings.   Initially with soft nodules in areas with tattoo after Tdap injection on 03/29/2023. Seen 04/05/2023.   Patient treated for acute allergic reaction with Atarax, Pepcid and Prednisone. Gradual resolution of symptoms.  Today with local redness on tattoos, no warmth. Some excoriations from scratching. No fever/chills.     Review of Systems   All other systems reviewed and are negative.         Objective:     Vitals:    09/19/23 0905   BP: 126/74   Pulse: 68     Physical Exam  Vitals and nursing note reviewed.   Constitutional:       General: She is not in acute distress.     Appearance: Normal appearance. She is not ill-appearing, toxic-appearing or diaphoretic.   HENT:      Head: Normocephalic and atraumatic.   Eyes:      General: No scleral icterus.     Conjunctiva/sclera: Conjunctivae normal.   Cardiovascular:      Rate and Rhythm: Normal rate.   Pulmonary:      Effort: Pulmonary effort is normal. No respiratory distress.   Skin:     Coloration: Skin is not pale.      Comments: Left arm tattoo with erythema on the ink tracing, excoriations. Lower back tattoo appears better but still itches per patient. No warmth, redness, drainage.    Neurological:      Mental Status: She is alert. Mental status is at baseline.   Psychiatric:         Attention and  Perception: Attention and perception normal.         Mood and Affect: Mood and affect normal.         Speech: Speech normal.         Behavior: Behavior normal.         Cognition and Memory: Cognition and memory normal.         Judgment: Judgment normal.       Assessment:       1. Rash due to allergy    2. Allergic reaction, subsequent encounter    3. Enlarged lymph node in neck    4. Prediabetes    5. Family history of diabetes mellitus    6. Low HDL (under 40)    7. Class 3 drug-induced obesity with serious comorbidity and body mass index (BMI) of 40.0 to 44.9 in adult    8. Unable to lose weight        Plan:   Recent relevant labs results reviewed with patient.       1. Rash due to allergy  2. Allergic reaction, subsequent encounter  3. Enlarged lymph node in neck  -     Ambulatory referral/consult to Dermatology; Future; Expected date: 09/26/2023  - Keep allergy referral as scheduled.   The LAD is reactive.   Unclear cause or trigger to specifically have allergy type symptoms over tattoos. Refer to allergy and dermatology to see how can treat ongoing.     4. Prediabetes  5. Family history of diabetes mellitus  6. Low HDL (under 40)  7. Class 3 drug-induced obesity with serious comorbidity and body mass index (BMI) of 40.0 to 44.9 in adult  8. Unable to lose weight  -     Ambulatory referral/consult to Weight Management Program; Future; Expected date: 09/26/2023  -     phentermine (ADIPEX-P) 37.5 mg tablet; Take 1 tablet (37.5 mg total) by mouth before breakfast.  Dispense: 90 tablet; Refill: 0  - Weight loss would greatly help other co morbidities. Continues healthy lifestyle modifications. Can do 3 month course of Adipex while scheduling for medical weight loss clinic.     Patient's questions answered. Plan reviewed with patient at the end of visit. Relevant precautions to chief complaint and reasons to seek further medical care or to contact the office sooner reviewed with patient.     Follow up in about 6  months (around 3/19/2024) for Annual Exam (prelabs).

## 2023-09-21 ENCOUNTER — OFFICE VISIT (OUTPATIENT)
Dept: FAMILY MEDICINE | Facility: HOSPITAL | Age: 48
End: 2023-09-21
Attending: INTERNAL MEDICINE
Payer: COMMERCIAL

## 2023-09-21 DIAGNOSIS — R59.0 REACTIVE CERVICAL LYMPHADENOPATHY: ICD-10-CM

## 2023-09-21 DIAGNOSIS — L50.8 CHRONIC URTICARIA: Primary | ICD-10-CM

## 2023-09-21 PROCEDURE — 99213 OFFICE O/P EST LOW 20 MIN: CPT | Performed by: INTERNAL MEDICINE

## 2023-09-21 RX ORDER — CETIRIZINE HYDROCHLORIDE 10 MG/1
10 TABLET ORAL 2 TIMES DAILY
Qty: 60 TABLET | Refills: 0 | Status: SHIPPED | OUTPATIENT
Start: 2023-09-21 | End: 2023-12-01
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
## 2023-10-25 ENCOUNTER — TELEPHONE (OUTPATIENT)
Dept: SURGERY | Facility: CLINIC | Age: 48
End: 2023-10-25
Payer: COMMERCIAL

## 2023-11-02 ENCOUNTER — TELEPHONE (OUTPATIENT)
Dept: ADMINISTRATIVE | Facility: HOSPITAL | Age: 48
End: 2023-11-02
Payer: COMMERCIAL

## 2023-11-03 ENCOUNTER — TELEPHONE (OUTPATIENT)
Dept: ADMINISTRATIVE | Facility: HOSPITAL | Age: 48
End: 2023-11-03
Payer: COMMERCIAL

## 2023-11-29 ENCOUNTER — TELEPHONE (OUTPATIENT)
Dept: SURGERY | Facility: CLINIC | Age: 48
End: 2023-11-29
Payer: COMMERCIAL

## 2023-11-30 ENCOUNTER — PATIENT MESSAGE (OUTPATIENT)
Dept: FAMILY MEDICINE | Facility: CLINIC | Age: 48
End: 2023-11-30
Payer: COMMERCIAL

## 2023-12-01 ENCOUNTER — OFFICE VISIT (OUTPATIENT)
Dept: DERMATOLOGY | Facility: CLINIC | Age: 48
End: 2023-12-01
Payer: COMMERCIAL

## 2023-12-01 DIAGNOSIS — L30.9 DERMATITIS, UNSPECIFIED: ICD-10-CM

## 2023-12-01 DIAGNOSIS — L92.3 TATTOO REACTION: Primary | ICD-10-CM

## 2023-12-01 PROCEDURE — 1159F PR MEDICATION LIST DOCUMENTED IN MEDICAL RECORD: ICD-10-PCS | Mod: CPTII,S$GLB,, | Performed by: STUDENT IN AN ORGANIZED HEALTH CARE EDUCATION/TRAINING PROGRAM

## 2023-12-01 PROCEDURE — 99999 PR PBB SHADOW E&M-EST. PATIENT-LVL III: CPT | Mod: PBBFAC,,, | Performed by: STUDENT IN AN ORGANIZED HEALTH CARE EDUCATION/TRAINING PROGRAM

## 2023-12-01 PROCEDURE — 3044F HG A1C LEVEL LT 7.0%: CPT | Mod: CPTII,S$GLB,, | Performed by: STUDENT IN AN ORGANIZED HEALTH CARE EDUCATION/TRAINING PROGRAM

## 2023-12-01 PROCEDURE — 99203 OFFICE O/P NEW LOW 30 MIN: CPT | Mod: S$GLB,,, | Performed by: STUDENT IN AN ORGANIZED HEALTH CARE EDUCATION/TRAINING PROGRAM

## 2023-12-01 PROCEDURE — 1159F MED LIST DOCD IN RCRD: CPT | Mod: CPTII,S$GLB,, | Performed by: STUDENT IN AN ORGANIZED HEALTH CARE EDUCATION/TRAINING PROGRAM

## 2023-12-01 PROCEDURE — 1160F PR REVIEW ALL MEDS BY PRESCRIBER/CLIN PHARMACIST DOCUMENTED: ICD-10-PCS | Mod: CPTII,S$GLB,, | Performed by: STUDENT IN AN ORGANIZED HEALTH CARE EDUCATION/TRAINING PROGRAM

## 2023-12-01 PROCEDURE — 99203 PR OFFICE/OUTPT VISIT, NEW, LEVL III, 30-44 MIN: ICD-10-PCS | Mod: S$GLB,,, | Performed by: STUDENT IN AN ORGANIZED HEALTH CARE EDUCATION/TRAINING PROGRAM

## 2023-12-01 PROCEDURE — 99999 PR PBB SHADOW E&M-EST. PATIENT-LVL III: ICD-10-PCS | Mod: PBBFAC,,, | Performed by: STUDENT IN AN ORGANIZED HEALTH CARE EDUCATION/TRAINING PROGRAM

## 2023-12-01 PROCEDURE — 3044F PR MOST RECENT HEMOGLOBIN A1C LEVEL <7.0%: ICD-10-PCS | Mod: CPTII,S$GLB,, | Performed by: STUDENT IN AN ORGANIZED HEALTH CARE EDUCATION/TRAINING PROGRAM

## 2023-12-01 PROCEDURE — 1160F RVW MEDS BY RX/DR IN RCRD: CPT | Mod: CPTII,S$GLB,, | Performed by: STUDENT IN AN ORGANIZED HEALTH CARE EDUCATION/TRAINING PROGRAM

## 2023-12-01 RX ORDER — BETAMETHASONE DIPROPIONATE 0.5 MG/G
OINTMENT TOPICAL
Qty: 45 G | Refills: 1 | Status: SHIPPED | OUTPATIENT
Start: 2023-12-01 | End: 2024-02-29

## 2023-12-01 NOTE — PROGRESS NOTES
Subjective:      Patient ID:  So Winkler is a 48 y.o. female who presents for   Chief Complaint   Patient presents with    Rash     Area of tattoos      48 y.o. female presents today for a rash.    Pt reports a rash in areas of tattoos both colors (Red and blue) comes and goes and clear today. When it comes she has swelling, raised, dry, itchy rash. Uses OTC hydrocortisone which does not help and vaseline. She thinks this all started 3 months ago after getting tetanus shot. She has had tattoos on both arms for about 5 years.    Denies fevers, chills, unintended weight loss, cough, SOB      Review of Systems   Constitutional:  Negative for fever, chills and fatigue.   Eyes:  Negative for itching and eye irritation.   Skin:  Positive for itching, rash and dry skin.       Objective:   Physical Exam   Constitutional: She appears well-developed and well-nourished. No distress.   Neurological: She is alert and oriented to person, place, and time. She is not disoriented.   Psychiatric: She has a normal mood and affect.        Diagram Legend     Erythematous scaling macule/papule c/w actinic keratosis       Vascular papule c/w angioma      Pigmented verrucoid papule/plaque c/w seborrheic keratosis      Yellow umbilicated papule c/w sebaceous hyperplasia      Irregularly shaped tan macule c/w lentigo     1-2 mm smooth white papules consistent with Milia      Movable subcutaneous cyst with punctum c/w epidermal inclusion cyst      Subcutaneous movable cyst c/w pilar cyst      Firm pink to brown papule c/w dermatofibroma      Pedunculated fleshy papule(s) c/w skin tag(s)      Evenly pigmented macule c/w junctional nevus     Mildly variegated pigmented, slightly irregular-bordered macule c/w mildly atypical nevus      Flesh colored to evenly pigmented papule c/w intradermal nevus       Pink pearly papule/plaque c/w basal cell carcinoma      Erythematous hyperkeratotic cursted plaque c/w SCC      Surgical scar with  no sign of skin cancer recurrence      Open and closed comedones      Inflammatory papules and pustules      Verrucoid papule consistent consistent with wart     Erythematous eczematous patches and plaques     Dystrophic onycholytic nail with subungual debris c/w onychomycosis     Umbilicated papule    Erythematous-base heme-crusted tan verrucoid plaque consistent with inflamed seborrheic keratosis     Erythematous Silvery Scaling Plaque c/w Psoriasis     See annotation        Assessment / Plan:        Tattoo reaction  Dermatitis, unspecified  Pt reports waxing and waning swelling, itching, flaking in areas of tattoo for 3 months  Today is clear of rash    Does not appear infectious (Atypical mycobacteria)  Does not appear like allergic contact dermatitis (often to red tattoo ink)    Possible she has delayed granulomatous or lichenoid tattoo reaction, these can sometimes spontaneously self-resolve    Recommend empiric treatment- betamethasone ointment bid x 2 weeks when rash flaring    I recommend biopsy if this continues to flare and does not resolve, pt declines biopsy today    Asked her to please send portal picture when rash flaring    RTC prn

## 2024-01-05 NOTE — TELEPHONE ENCOUNTER
Pt on bariatric work queue list, completed 11- FS appt., pt has to go through [employer] as per GN, pt aware and instructed to contact [employer] to start the process. Deactivated 11- GN to remove from work queue list.

## 2024-01-12 ENCOUNTER — PATIENT MESSAGE (OUTPATIENT)
Dept: DERMATOLOGY | Facility: CLINIC | Age: 49
End: 2024-01-12
Payer: COMMERCIAL

## 2024-02-29 ENCOUNTER — OFFICE VISIT (OUTPATIENT)
Dept: FAMILY MEDICINE | Facility: CLINIC | Age: 49
End: 2024-02-29
Payer: COMMERCIAL

## 2024-02-29 VITALS
SYSTOLIC BLOOD PRESSURE: 128 MMHG | DIASTOLIC BLOOD PRESSURE: 86 MMHG | HEART RATE: 58 BPM | BODY MASS INDEX: 40.92 KG/M2 | WEIGHT: 254.63 LBS | HEIGHT: 66 IN | OXYGEN SATURATION: 99 %

## 2024-02-29 DIAGNOSIS — B96.89 BACTERIAL PHARYNGITIS: ICD-10-CM

## 2024-02-29 DIAGNOSIS — E78.6 LOW HDL (UNDER 40): ICD-10-CM

## 2024-02-29 DIAGNOSIS — R59.0 ENLARGED LYMPH NODE IN NECK: ICD-10-CM

## 2024-02-29 DIAGNOSIS — E66.1 CLASS 3 DRUG-INDUCED OBESITY WITH SERIOUS COMORBIDITY AND BODY MASS INDEX (BMI) OF 40.0 TO 44.9 IN ADULT: ICD-10-CM

## 2024-02-29 DIAGNOSIS — Z12.11 COLON CANCER SCREENING: ICD-10-CM

## 2024-02-29 DIAGNOSIS — Z11.3 ROUTINE SCREENING FOR STI (SEXUALLY TRANSMITTED INFECTION): ICD-10-CM

## 2024-02-29 DIAGNOSIS — R59.1 LYMPHADENOPATHY: ICD-10-CM

## 2024-02-29 DIAGNOSIS — R73.03 PREDIABETES: ICD-10-CM

## 2024-02-29 DIAGNOSIS — Z00.01 ENCOUNTER FOR GENERAL ADULT MEDICAL EXAMINATION WITH ABNORMAL FINDINGS: Primary | ICD-10-CM

## 2024-02-29 DIAGNOSIS — R53.82 CHRONIC FATIGUE: ICD-10-CM

## 2024-02-29 DIAGNOSIS — Z12.31 ENCOUNTER FOR SCREENING MAMMOGRAM FOR MALIGNANT NEOPLASM OF BREAST: ICD-10-CM

## 2024-02-29 DIAGNOSIS — B37.9 ANTIBIOTIC-INDUCED YEAST INFECTION: ICD-10-CM

## 2024-02-29 DIAGNOSIS — T36.95XA ANTIBIOTIC-INDUCED YEAST INFECTION: ICD-10-CM

## 2024-02-29 DIAGNOSIS — Z83.3 FAMILY HISTORY OF DIABETES MELLITUS: ICD-10-CM

## 2024-02-29 DIAGNOSIS — J02.8 BACTERIAL PHARYNGITIS: ICD-10-CM

## 2024-02-29 LAB
CTP QC/QA: YES
S PYO RRNA THROAT QL PROBE: NEGATIVE

## 2024-02-29 PROCEDURE — 3008F BODY MASS INDEX DOCD: CPT | Mod: CPTII,S$GLB,, | Performed by: FAMILY MEDICINE

## 2024-02-29 PROCEDURE — 99999 PR PBB SHADOW E&M-EST. PATIENT-LVL V: CPT | Mod: PBBFAC,,, | Performed by: FAMILY MEDICINE

## 2024-02-29 PROCEDURE — 3079F DIAST BP 80-89 MM HG: CPT | Mod: CPTII,S$GLB,, | Performed by: FAMILY MEDICINE

## 2024-02-29 PROCEDURE — 87880 STREP A ASSAY W/OPTIC: CPT | Mod: QW,S$GLB,, | Performed by: FAMILY MEDICINE

## 2024-02-29 PROCEDURE — 3074F SYST BP LT 130 MM HG: CPT | Mod: CPTII,S$GLB,, | Performed by: FAMILY MEDICINE

## 2024-02-29 PROCEDURE — 1159F MED LIST DOCD IN RCRD: CPT | Mod: CPTII,S$GLB,, | Performed by: FAMILY MEDICINE

## 2024-02-29 PROCEDURE — 1160F RVW MEDS BY RX/DR IN RCRD: CPT | Mod: CPTII,S$GLB,, | Performed by: FAMILY MEDICINE

## 2024-02-29 PROCEDURE — 99396 PREV VISIT EST AGE 40-64: CPT | Mod: S$GLB,,, | Performed by: FAMILY MEDICINE

## 2024-02-29 RX ORDER — IBUPROFEN 800 MG/1
800 TABLET ORAL 3 TIMES DAILY
COMMUNITY
Start: 2024-01-21

## 2024-02-29 RX ORDER — AMOXICILLIN 500 MG/1
1000 TABLET, FILM COATED ORAL DAILY
Qty: 20 TABLET | Refills: 0 | Status: SHIPPED | OUTPATIENT
Start: 2024-02-29 | End: 2024-03-10

## 2024-02-29 RX ORDER — DIAZEPAM 10 MG/1
10 TABLET ORAL DAILY PRN
COMMUNITY
Start: 2024-01-21 | End: 2024-02-29

## 2024-02-29 RX ORDER — FLUCONAZOLE 150 MG/1
150 TABLET ORAL ONCE
Qty: 1 TABLET | Refills: 0 | Status: SHIPPED | OUTPATIENT
Start: 2024-02-29 | End: 2024-02-29

## 2024-02-29 NOTE — PROGRESS NOTES
"Subjective:         Patient ID: So Winkler is a 48 y.o. female.    Chief Complaint: Annual Exam    Patient Active Problem List   Diagnosis    Class 3 drug-induced obesity with serious comorbidity and body mass index (BMI) of 40.0 to 44.9 in adult    Prediabetes    Family history of diabetes mellitus    Radial scar of breast    Low HDL (under 40)    Enlarged lymph node in neck      HPI    So is a 48 y.o. female who presents today for annual exam.     The 10-year ASCVD risk score (Baylee LOUIS, et al., 2019) is: 2.3%    Values used to calculate the score:      Age: 48 years      Sex: Female      Is Non- : Yes      Diabetic: No      Tobacco smoker: No      Systolic Blood Pressure: 128 mmHg      Is BP treated: No      HDL Cholesterol: 35 mg/dL      Total Cholesterol: 171 mg/dL    Review of Systems   All other systems reviewed and are negative.       Objective:     Vitals:    02/29/24 0741   BP: 128/86   BP Location: Left arm   Patient Position: Sitting   BP Method: Medium (Manual)   Pulse: (!) 58   SpO2: 99%   Weight: 115.5 kg (254 lb 10.1 oz)   Height: 5' 6" (1.676 m)         Physical Exam  Vitals and nursing note reviewed.   Constitutional:       General: She is not in acute distress.     Appearance: Normal appearance. She is not ill-appearing, toxic-appearing or diaphoretic.   HENT:      Head: Normocephalic and atraumatic.   Eyes:      General: No scleral icterus.     Conjunctiva/sclera: Conjunctivae normal.   Cardiovascular:      Rate and Rhythm: Normal rate.   Pulmonary:      Effort: Pulmonary effort is normal. No respiratory distress.   Skin:     Coloration: Skin is not pale.   Neurological:      Mental Status: She is alert. Mental status is at baseline.   Psychiatric:         Attention and Perception: Attention and perception normal.         Mood and Affect: Mood and affect normal.         Speech: Speech normal.         Behavior: Behavior normal.         Cognition and Memory: " Cognition and memory normal.         Judgment: Judgment normal.       Assessment:       1. Encounter for general adult medical examination with abnormal findings    2. Prediabetes    3. Family history of diabetes mellitus    4. Low HDL (under 40)    5. Class 3 drug-induced obesity with serious comorbidity and body mass index (BMI) of 40.0 to 44.9 in adult    6. Encounter for screening mammogram for malignant neoplasm of breast    7. Colon cancer screening    8. Routine screening for STI (sexually transmitted infection)    9. Chronic fatigue    10. Bacterial pharyngitis    11. Antibiotic-induced yeast infection    12. Enlarged lymph node in neck    13. Lymphadenopathy          Plan:   Recent relevant labs results reviewed with patient.         1. Encounter for general adult medical examination with abnormal findings  -     Urinalysis, Reflex to Urine Culture Urine, Clean Catch; Future; Expected date: 02/29/2024  -     Hepatic Function Panel; Future; Expected date: 02/29/2024  -     Renal Function Panel; Future; Expected date: 02/29/2024  -     Lipid Panel; Future; Expected date: 02/29/2024  -     TSH; Future; Expected date: 02/29/2024  -     Hemoglobin A1C; Future; Expected date: 02/29/2024  -     CBC Auto Differential; Future; Expected date: 02/29/2024  - Risk and age appropriate anticipatory guidance.  reviewed and updated. Recommendations discussed with patient as appropriate.     2. Prediabetes  -     Hemoglobin A1C; Future; Expected date: 02/29/2024  -     Ambulatory referral/consult to Bariatric Surgery; Future; Expected date: 03/07/2024  Recheck     3. Family history of diabetes mellitus  PreDM, weight management    4. Low HDL (under 40)  -     Lipid Panel; Future; Expected date: 02/29/2024    5. Class 3 drug-induced obesity with serious comorbidity and body mass index (BMI) of 40.0 to 44.9 in adult  -     Lipid Panel; Future; Expected date: 02/29/2024  -     TSH; Future; Expected date: 02/29/2024  -      Hemoglobin A1C; Future; Expected date: 02/29/2024  -     Ambulatory referral/consult to Bariatric Surgery; Future; Expected date: 03/07/2024    6. Encounter for screening mammogram for malignant neoplasm of breast  -     Mammo Digital Screening Bilat w/ Jaiden; Future; Expected date: 06/01/2024    7. Colon cancer screening  -     Cologuard Screening (Multitarget Stool DNA); Future; Expected date: 02/29/2024    8. Routine screening for STI (sexually transmitted infection)  -     C. trachomatis/N. gonorrhoeae by AMP DNA; Future  -     HIV 1/2 Ag/Ab (4th Gen); Future; Expected date: 02/29/2024  -     RPR; Future; Expected date: 02/29/2024  -     C. trachomatis/N. gonorrhoeae by AMP DNA; Future  -     HIV 1/2 Ag/Ab (4th Gen); Future; Expected date: 02/29/2024  -     RPR; Future; Expected date: 02/29/2024  Exposure last week - test now and in a few months. 2 weeks lag time for HIV test and RPR serology testing can take 2 months. Asymptomatic.     9. Chronic fatigue  -     IRON AND TIBC; Future; Expected date: 02/29/2024  -     Ferritin; Future; Expected date: 02/29/2024  -     Vitamin B12; Future; Expected date: 02/29/2024  -     FOLATE; Future; Expected date: 02/29/2024  -     Vitamin D; Future; Expected date: 02/29/2024  No snoring, chronic, likely 2/2 obesity, but prior blood smear with signs of possible low iron    10. Bacterial pharyngitis  -     amoxicillin (AMOXIL) 500 MG Tab; Take 2 tablets (1,000 mg total) by mouth once daily. for 10 days  Dispense: 20 tablet; Refill: 0  -     POCT Rapid Strep A  Reports 1 week of sore throat with mild cough starting. No fever/chills, myalgia. Pain in ears and throat.   No improvement since onset.     11. Antibiotic-induced yeast infection  -     fluconazole (DIFLUCAN) 150 MG Tab; Take 1 tablet (150 mg total) by mouth once. for 1 dose  Dispense: 1 tablet; Refill: 0    12. Enlarged lymph node in neck  -     Ambulatory referral/consult to ENT; Future; Expected date:  03/07/2024  Smear unremarkable, CBC unremarkable. US done 06/2023. Persistent, refer for further eval.     13. Lymphadenopathy  -     US Abdomen Limited; Future; Expected date: 02/29/2024  Check spleen. Possible fatty liver with prior LFT elevation that resolved.     Patient's questions answered. Plan reviewed with patient at the end of visit. Relevant precautions to chief complaint and reasons to seek further medical care or to contact the office sooner reviewed with patient.     Follow up in about 1 year (around 2/28/2025) for Annual Exam.

## 2024-02-29 NOTE — PATIENT INSTRUCTIONS
[facility] - please call to schedule initial intake appointment and determine if your insurance will cover services  Watch video on [email or web address]  Call Office [phone]

## 2024-03-01 ENCOUNTER — LAB VISIT (OUTPATIENT)
Dept: LAB | Facility: HOSPITAL | Age: 49
End: 2024-03-01
Attending: FAMILY MEDICINE
Payer: COMMERCIAL

## 2024-03-01 DIAGNOSIS — Z11.3 ROUTINE SCREENING FOR STI (SEXUALLY TRANSMITTED INFECTION): ICD-10-CM

## 2024-03-01 DIAGNOSIS — Z00.01 ENCOUNTER FOR GENERAL ADULT MEDICAL EXAMINATION WITH ABNORMAL FINDINGS: ICD-10-CM

## 2024-03-01 LAB
BILIRUB UR QL STRIP: NEGATIVE
CLARITY UR REFRACT.AUTO: CLEAR
COLOR UR AUTO: YELLOW
GLUCOSE UR QL STRIP: NEGATIVE
HGB UR QL STRIP: NEGATIVE
KETONES UR QL STRIP: ABNORMAL
LEUKOCYTE ESTERASE UR QL STRIP: NEGATIVE
NITRITE UR QL STRIP: NEGATIVE
PH UR STRIP: 6 [PH] (ref 5–8)
PROT UR QL STRIP: NEGATIVE
SP GR UR STRIP: >=1.03 (ref 1–1.03)
URN SPEC COLLECT METH UR: ABNORMAL
UROBILINOGEN UR STRIP-ACNC: 1 EU/DL

## 2024-03-01 PROCEDURE — 87491 CHLMYD TRACH DNA AMP PROBE: CPT | Mod: PN | Performed by: FAMILY MEDICINE

## 2024-03-01 PROCEDURE — 81003 URINALYSIS AUTO W/O SCOPE: CPT | Mod: PN | Performed by: FAMILY MEDICINE

## 2024-03-04 DIAGNOSIS — J02.8 BACTERIAL PHARYNGITIS: ICD-10-CM

## 2024-03-04 DIAGNOSIS — B96.89 BACTERIAL PHARYNGITIS: ICD-10-CM

## 2024-03-04 LAB
C TRACH DNA SPEC QL NAA+PROBE: NOT DETECTED
N GONORRHOEA DNA SPEC QL NAA+PROBE: NOT DETECTED

## 2024-03-04 RX ORDER — AMOXICILLIN 500 MG/1
1000 TABLET, FILM COATED ORAL DAILY
Qty: 20 TABLET | Refills: 0 | OUTPATIENT
Start: 2024-03-04 | End: 2024-03-14

## 2024-03-04 NOTE — TELEPHONE ENCOUNTER
Refill Decision Note   So Winkler  is requesting a refill authorization.  Brief Assessment and Rationale for Refill:  Quick Discontinue     Medication Therapy Plan:         Comments:     Note composed:9:50 AM 03/04/2024             Appointments     Last Visit   2/29/2024 Sissy Zazueta MD   Next Visit   Visit date not found Sissy Zazueta MD

## 2024-03-05 PROBLEM — E55.9 VITAMIN D DEFICIENCY: Status: ACTIVE | Noted: 2024-03-05

## 2024-04-05 ENCOUNTER — OFFICE VISIT (OUTPATIENT)
Dept: OTOLARYNGOLOGY | Facility: CLINIC | Age: 49
End: 2024-04-05
Payer: COMMERCIAL

## 2024-04-05 VITALS
DIASTOLIC BLOOD PRESSURE: 85 MMHG | WEIGHT: 249 LBS | SYSTOLIC BLOOD PRESSURE: 122 MMHG | HEART RATE: 62 BPM | BODY MASS INDEX: 40.19 KG/M2

## 2024-04-05 DIAGNOSIS — J30.9 CHRONIC ALLERGIC RHINITIS: Chronic | ICD-10-CM

## 2024-04-05 DIAGNOSIS — R59.0 CERVICAL LYMPHADENOPATHY: Primary | Chronic | ICD-10-CM

## 2024-04-05 DIAGNOSIS — J34.3 HYPERTROPHY OF INFERIOR NASAL TURBINATE: Chronic | ICD-10-CM

## 2024-04-05 PROCEDURE — 99999 PR PBB SHADOW E&M-EST. PATIENT-LVL III: CPT | Mod: PBBFAC,,, | Performed by: OTOLARYNGOLOGY

## 2024-04-05 PROCEDURE — 3044F HG A1C LEVEL LT 7.0%: CPT | Mod: CPTII,S$GLB,, | Performed by: OTOLARYNGOLOGY

## 2024-04-05 PROCEDURE — 1159F MED LIST DOCD IN RCRD: CPT | Mod: CPTII,S$GLB,, | Performed by: OTOLARYNGOLOGY

## 2024-04-05 PROCEDURE — 1160F RVW MEDS BY RX/DR IN RCRD: CPT | Mod: CPTII,S$GLB,, | Performed by: OTOLARYNGOLOGY

## 2024-04-05 PROCEDURE — 99204 OFFICE O/P NEW MOD 45 MIN: CPT | Mod: S$GLB,,, | Performed by: OTOLARYNGOLOGY

## 2024-04-05 PROCEDURE — 3074F SYST BP LT 130 MM HG: CPT | Mod: CPTII,S$GLB,, | Performed by: OTOLARYNGOLOGY

## 2024-04-05 PROCEDURE — 3008F BODY MASS INDEX DOCD: CPT | Mod: CPTII,S$GLB,, | Performed by: OTOLARYNGOLOGY

## 2024-04-05 PROCEDURE — 3079F DIAST BP 80-89 MM HG: CPT | Mod: CPTII,S$GLB,, | Performed by: OTOLARYNGOLOGY

## 2024-04-05 RX ORDER — AZELASTINE 1 MG/ML
1 SPRAY, METERED NASAL 2 TIMES DAILY
Qty: 30 ML | Refills: 3 | Status: SHIPPED | OUTPATIENT
Start: 2024-04-05 | End: 2025-04-05

## 2024-04-05 RX ORDER — FLUTICASONE PROPIONATE 50 MCG
1 SPRAY, SUSPENSION (ML) NASAL DAILY
Qty: 9.9 ML | Refills: 11 | Status: SHIPPED | OUTPATIENT
Start: 2024-04-05

## 2024-04-05 NOTE — PROGRESS NOTES
Subjective:      So Winkler is a 48 y.o. female who was referred to me by Dr. Sissy Zazueta in consultation for  concern of enlarged lymph nodes . She reports that she has been having swelling intermittently on the right side of the neck. She states that it has been occurring for several months. She feels that this has been  a persistent issue since she received a tetanus shot last fall.  She notes that she will break out in hives only in area of tattoos on her upper arms around the same time as the swelling in her neck.  She shows pictures of rash today. She did see dermatology in  who thought it could be delayed granulomatous or lichenoid tattoo reaction. She was given betamethasone ointment.   She does not feel that the neck is swollen today. She denies fever, chills, or night sweats, or unintentional weight loss.      She is not an active smoker.  She does not use smokeless tobacco.    Past Medical History  She has a past medical history of Ovarian cyst.    Past Surgical History  She has a past surgical history that includes Partial hysterectomy;  section, classic;  section, low transverse; Oophorectomy; Hysterectomy; Cardiac surgery (); and Excisional biopsy (Left, 6/3/2022).    Family History  Her family history includes Breast cancer in her mother; Diabetes in her mother; Hypertension in her mother.    Social History  She reports that she has quit smoking. She has been exposed to tobacco smoke. She has never used smokeless tobacco. She reports current alcohol use of about 2.0 standard drinks of alcohol per week. She reports current drug use. Drug: Marijuana.    Allergies  She is allergic to boostrix tdap [diphth,pertus(acell),tetanus] and zosyn [piperacillin-tazobactam].    Medications  She has a current medication list which includes the following prescription(s): azelastine, fluticasone propionate, and ibuprofen.    Review of Systems   Constitutional: Negative.    HENT:  Negative.     Eyes: Negative.    Respiratory: Negative.     Cardiovascular: Negative.    Gastrointestinal: Negative.    Endocrine: Positive for heat intolerance.   Genitourinary: Negative.    Musculoskeletal:  Positive for neck pain.   Skin:  Positive for rash.   Allergic/Immunologic: Negative.    Neurological: Negative.    Hematological: Negative.    Psychiatric/Behavioral: Negative.            Objective:     /85 (BP Location: Left arm, Patient Position: Sitting, BP Method: Large (Automatic))   Pulse 62   Wt 112.9 kg (249 lb 0.2 oz)   LMP  (LMP Unknown)   BMI 40.19 kg/m²      Constitutional:   She appears well-developed and well-nourished.     Head:  Normocephalic and atraumatic. Salivary glands normal.  Facial strength is normal.      Ears:  Right ear hearing normal to normal and whispered voice; external ear normal without scars, lesions, or masses; ear canal, tympanic membrane, and middle ear normal. and left ear hearing normal to normal and whispered voice; external ear normal without scars, lesions, or masses; ear canal, tympanic membrane, and middle ear normal..     Nose:  Mucosal edema present. No rhinorrhea or sinus tenderness. Turbinates abnormal.  Right sinus exhibits no maxillary sinus tenderness and no frontal sinus tenderness. Left sinus exhibits no maxillary sinus tenderness and no frontal sinus tenderness.     Mouth/Throat  Lips, teeth, and gums normal and oropharynx normal.       Procedure    None        Data Reviewed    Ultrasound soft tissue neck 6/21/2024:        Assessment:   Impression:     1. There are 2 lymph nodes visualized on the right side of the neck in the area of concern. The larger 1 measures 23 mm x 9 mm x 16 mm.  2. There is a normal appearing lymph node on the left side of the neck that measures 12 mm x 5 mm x 14 mm.  1. Cervical lymphadenopathy    2. Chronic allergic rhinitis    3. Hypertrophy of inferior nasal turbinate         Plan:     I had a long discussion with the  patient regarding her condition and the further workup and management options.  Discussed that this is mostly likely reactive lymphadenopathy. Today's exam does not reveal any specific enlarged lymph node. Will follow up u/s obtained in June 2023.   Start Flonase 2 sprays per nostril daily  Start Astelin 1 spray per nostril BID  Schedule dermatologist follow up.     Yamilka Milton MD

## 2024-04-09 ENCOUNTER — TELEPHONE (OUTPATIENT)
Dept: BARIATRICS | Facility: CLINIC | Age: 49
End: 2024-04-09
Payer: COMMERCIAL

## 2024-04-09 NOTE — TELEPHONE ENCOUNTER
"Pt completed FS phone appointment 4/8/2024 noting "Contacted patient for bariatrics FC counseling appointment. Reviewed and verified patient demographics. Informed patient of [facility]ELA referral process through [employer] as patient is [employer] employee. Gave direct number to [employer], patient will call to enroll. Informed patient once we receive referral, we will call back to start ESTELA process. Patient stated understanding.  Do not call to schedule eval. No follow up is required until ESTELA referral is received." Dashboard updated.   "

## 2024-04-11 ENCOUNTER — PATIENT MESSAGE (OUTPATIENT)
Dept: OBSTETRICS AND GYNECOLOGY | Facility: CLINIC | Age: 49
End: 2024-04-11
Payer: COMMERCIAL

## 2024-06-07 ENCOUNTER — PATIENT OUTREACH (OUTPATIENT)
Dept: ADMINISTRATIVE | Facility: HOSPITAL | Age: 49
End: 2024-06-07
Payer: COMMERCIAL

## 2024-06-28 ENCOUNTER — HOSPITAL ENCOUNTER (OUTPATIENT)
Dept: RADIOLOGY | Facility: HOSPITAL | Age: 49
Discharge: HOME | End: 2024-06-28
Attending: FAMILY MEDICINE
Payer: COMMERCIAL

## 2024-06-28 DIAGNOSIS — Z12.31 ENCOUNTER FOR SCREENING MAMMOGRAM FOR MALIGNANT NEOPLASM OF BREAST: ICD-10-CM

## 2024-06-28 PROCEDURE — 77067 SCR MAMMO BI INCL CAD: CPT | Mod: TC

## 2025-03-10 ENCOUNTER — PATIENT MESSAGE (OUTPATIENT)
Dept: FAMILY MEDICINE | Facility: CLINIC | Age: 50
End: 2025-03-10
Payer: COMMERCIAL

## 2025-04-01 ENCOUNTER — PATIENT MESSAGE (OUTPATIENT)
Dept: ADMINISTRATIVE | Facility: HOSPITAL | Age: 50
End: 2025-04-01
Payer: COMMERCIAL

## 2025-04-02 DIAGNOSIS — Z12.11 SCREENING FOR COLON CANCER: ICD-10-CM

## 2025-04-05 ENCOUNTER — HOSPITAL ENCOUNTER (EMERGENCY)
Facility: HOSPITAL | Age: 50
Discharge: HOME | End: 2025-04-05
Attending: EMERGENCY MEDICINE
Payer: COMMERCIAL

## 2025-04-05 VITALS
HEIGHT: 66 IN | HEART RATE: 66 BPM | BODY MASS INDEX: 42.75 KG/M2 | RESPIRATION RATE: 24 BRPM | OXYGEN SATURATION: 98 % | TEMPERATURE: 98 F | SYSTOLIC BLOOD PRESSURE: 140 MMHG | WEIGHT: 266 LBS | DIASTOLIC BLOOD PRESSURE: 92 MMHG

## 2025-04-05 DIAGNOSIS — M79.605 LEFT LEG PAIN: ICD-10-CM

## 2025-04-05 DIAGNOSIS — S83.92XA SPRAIN OF LEFT KNEE, UNSPECIFIED LIGAMENT, INITIAL ENCOUNTER: Primary | ICD-10-CM

## 2025-04-05 PROCEDURE — 29505 APPLICATION LONG LEG SPLINT: CPT | Mod: LT

## 2025-04-05 PROCEDURE — 25000003 PHARM REV CODE 250: Performed by: PHYSICIAN ASSISTANT

## 2025-04-05 PROCEDURE — 63600175 PHARM REV CODE 636 W HCPCS: Mod: JZ,TB | Performed by: PHYSICIAN ASSISTANT

## 2025-04-05 PROCEDURE — 99285 EMERGENCY DEPT VISIT HI MDM: CPT | Mod: 25

## 2025-04-05 PROCEDURE — 96372 THER/PROPH/DIAG INJ SC/IM: CPT | Performed by: PHYSICIAN ASSISTANT

## 2025-04-05 RX ORDER — ACETAMINOPHEN 325 MG/1
650 TABLET ORAL
Status: COMPLETED | OUTPATIENT
Start: 2025-04-05 | End: 2025-04-05

## 2025-04-05 RX ORDER — NAPROXEN 500 MG/1
500 TABLET ORAL 2 TIMES DAILY WITH MEALS
Qty: 30 TABLET | Refills: 0 | Status: SHIPPED | OUTPATIENT
Start: 2025-04-05

## 2025-04-05 RX ORDER — KETOROLAC TROMETHAMINE 30 MG/ML
15 INJECTION, SOLUTION INTRAMUSCULAR; INTRAVENOUS
Status: COMPLETED | OUTPATIENT
Start: 2025-04-05 | End: 2025-04-05

## 2025-04-05 RX ADMIN — ACETAMINOPHEN 650 MG: 325 TABLET ORAL at 05:04

## 2025-04-05 RX ADMIN — KETOROLAC TROMETHAMINE 15 MG: 30 INJECTION, SOLUTION INTRAMUSCULAR at 06:04
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
## 2025-04-05 NOTE — ED NOTES
Pt reports left calf pain x 1 week. Pt reports twisting injury to left leg earlier today causing pain to left knee. No swelling or redness noted to left calf. Pt able to ambulate.

## 2025-04-05 NOTE — FIRST PROVIDER EVALUATION
Emergency Department TeleTriage Encounter Note      CHIEF COMPLAINT    Chief Complaint   Patient presents with    Leg Pain     C/o L knee pain radiating down to L foot x1 week. Denies trauma. Pt reports she twisted L knee today and pain has worsened since 1400. +numbness to L foot x1 week.        VITAL SIGNS   Initial Vitals [04/05/25 1708]   BP Pulse Resp Temp SpO2   (!) 140/92 66 (!) 24 98.2 °F (36.8 °C) 98 %      MAP       --            ALLERGIES    Review of patient's allergies indicates:   Allergen Reactions    Boostrix tdap [diphth,pertus(acell),tetanus] Rash     Patient with nodules under skin in areas of tattoo day after Tdap vaccine.     Zosyn [piperacillin-tazobactam] Rash       PROVIDER TRIAGE NOTE  Patient presents with complaint of left knee pain. Twisting injury after hitting foot on a basket.       ORDERS  Labs Reviewed - No data to display    ED Orders (720h ago, onward)      None              Virtual Visit Note: The provider triage portion of this emergency department evaluation and documentation was performed via [employer], a HIPAA-compliant telemedicine application, in concert with a tele-presenter in the room. A face to face patient evaluation with one of my colleagues will occur once the patient is placed in an emergency department room.      DISCLAIMER: This note was prepared with [employer] voice recognition transcription software. Garbled syntax, mangled pronouns, and other bizarre constructions may be attributed to that software system.

## 2025-04-06 NOTE — DISCHARGE INSTRUCTIONS

## 2025-05-16 ENCOUNTER — OFFICE VISIT (OUTPATIENT)
Dept: FAMILY MEDICINE | Facility: CLINIC | Age: 50
End: 2025-05-16
Payer: COMMERCIAL

## 2025-05-16 ENCOUNTER — LAB VISIT (OUTPATIENT)
Dept: LAB | Facility: HOSPITAL | Age: 50
End: 2025-05-16
Attending: FAMILY MEDICINE
Payer: COMMERCIAL

## 2025-05-16 ENCOUNTER — PATIENT OUTREACH (OUTPATIENT)
Dept: ADMINISTRATIVE | Facility: OTHER | Age: 50
End: 2025-05-16
Payer: COMMERCIAL

## 2025-05-16 VITALS
OXYGEN SATURATION: 98 % | HEART RATE: 58 BPM | WEIGHT: 259.94 LBS | SYSTOLIC BLOOD PRESSURE: 118 MMHG | HEIGHT: 66 IN | BODY MASS INDEX: 41.78 KG/M2 | DIASTOLIC BLOOD PRESSURE: 80 MMHG

## 2025-05-16 DIAGNOSIS — R73.03 PREDIABETES: ICD-10-CM

## 2025-05-16 DIAGNOSIS — G62.9 NEUROPATHY: ICD-10-CM

## 2025-05-16 DIAGNOSIS — R59.0 ENLARGED LYMPH NODE IN NECK: ICD-10-CM

## 2025-05-16 DIAGNOSIS — M25.562 CHRONIC PAIN OF LEFT KNEE: ICD-10-CM

## 2025-05-16 DIAGNOSIS — Z11.3 ROUTINE SCREENING FOR STI (SEXUALLY TRANSMITTED INFECTION): ICD-10-CM

## 2025-05-16 DIAGNOSIS — Z00.01 ENCOUNTER FOR GENERAL ADULT MEDICAL EXAMINATION WITH ABNORMAL FINDINGS: Primary | ICD-10-CM

## 2025-05-16 DIAGNOSIS — Z12.31 ENCOUNTER FOR SCREENING MAMMOGRAM FOR BREAST CANCER: ICD-10-CM

## 2025-05-16 DIAGNOSIS — Z12.11 COLON CANCER SCREENING: ICD-10-CM

## 2025-05-16 DIAGNOSIS — Z83.3 FAMILY HISTORY OF DIABETES MELLITUS: ICD-10-CM

## 2025-05-16 DIAGNOSIS — E66.813 CLASS 3 DRUG-INDUCED OBESITY WITH SERIOUS COMORBIDITY AND BODY MASS INDEX (BMI) OF 40.0 TO 44.9 IN ADULT: ICD-10-CM

## 2025-05-16 DIAGNOSIS — R79.89 ELEVATED LIVER FUNCTION TESTS: ICD-10-CM

## 2025-05-16 DIAGNOSIS — E55.9 VITAMIN D DEFICIENCY: ICD-10-CM

## 2025-05-16 DIAGNOSIS — E66.1 CLASS 3 DRUG-INDUCED OBESITY WITH SERIOUS COMORBIDITY AND BODY MASS INDEX (BMI) OF 40.0 TO 44.9 IN ADULT: ICD-10-CM

## 2025-05-16 DIAGNOSIS — R03.0 ELEVATED BLOOD PRESSURE READING WITHOUT DIAGNOSIS OF HYPERTENSION: ICD-10-CM

## 2025-05-16 DIAGNOSIS — Z00.01 ENCOUNTER FOR GENERAL ADULT MEDICAL EXAMINATION WITH ABNORMAL FINDINGS: ICD-10-CM

## 2025-05-16 DIAGNOSIS — G89.29 CHRONIC PAIN OF LEFT KNEE: ICD-10-CM

## 2025-05-16 LAB
25(OH)D3+25(OH)D2 SERPL-MCNC: 27 NG/ML (ref 30–96)
ABSOLUTE EOSINOPHIL (OHS): 0.23 K/UL
ABSOLUTE MONOCYTE (OHS): 0.57 K/UL (ref 0.3–1)
ABSOLUTE NEUTROPHIL COUNT (OHS): 1.19 K/UL (ref 1.8–7.7)
ALBUMIN SERPL BCP-MCNC: 3.6 G/DL (ref 3.5–5.2)
ALP SERPL-CCNC: 137 UNIT/L (ref 40–150)
ALT SERPL W/O P-5'-P-CCNC: 46 UNIT/L (ref 10–44)
ANION GAP (OHS): 9 MMOL/L (ref 8–16)
AST SERPL-CCNC: 40 UNIT/L (ref 11–45)
BASOPHILS # BLD AUTO: 0.08 K/UL
BASOPHILS NFR BLD AUTO: 1.8 %
BILIRUB SERPL-MCNC: 0.7 MG/DL (ref 0.1–1)
BUN SERPL-MCNC: 16 MG/DL (ref 6–20)
CALCIUM SERPL-MCNC: 9.7 MG/DL (ref 8.7–10.5)
CHLORIDE SERPL-SCNC: 107 MMOL/L (ref 95–110)
CHOLEST SERPL-MCNC: 192 MG/DL (ref 120–199)
CHOLEST/HDLC SERPL: 5.2 {RATIO} (ref 2–5)
CO2 SERPL-SCNC: 24 MMOL/L (ref 23–29)
CREAT SERPL-MCNC: 0.8 MG/DL (ref 0.5–1.4)
EAG (OHS): 123 MG/DL (ref 68–131)
ERYTHROCYTE [DISTWIDTH] IN BLOOD BY AUTOMATED COUNT: 14.6 % (ref 11.5–14.5)
FOLATE SERPL-MCNC: 14.2 NG/ML (ref 4–24)
GFR SERPLBLD CREATININE-BSD FMLA CKD-EPI: >60 ML/MIN/1.73/M2
GLUCOSE SERPL-MCNC: 68 MG/DL (ref 70–110)
HBA1C MFR BLD: 5.9 % (ref 4–5.6)
HCT VFR BLD AUTO: 50.2 % (ref 37–48.5)
HDLC SERPL-MCNC: 37 MG/DL (ref 40–75)
HDLC SERPL: 19.3 % (ref 20–50)
HGB BLD-MCNC: 15.4 GM/DL (ref 12–16)
HIV 1+2 AB+HIV1 P24 AG SERPL QL IA: NORMAL
IMM GRANULOCYTES # BLD AUTO: 0.01 K/UL (ref 0–0.04)
IMM GRANULOCYTES NFR BLD AUTO: 0.2 % (ref 0–0.5)
LDLC SERPL CALC-MCNC: 138.8 MG/DL (ref 63–159)
LYMPHOCYTES # BLD AUTO: 2.45 K/UL (ref 1–4.8)
MCH RBC QN AUTO: 26.8 PG (ref 27–31)
MCHC RBC AUTO-ENTMCNC: 30.7 G/DL (ref 32–36)
MCV RBC AUTO: 87 FL (ref 82–98)
NONHDLC SERPL-MCNC: 155 MG/DL
NUCLEATED RBC (/100WBC) (OHS): 0 /100 WBC
PLATELET # BLD AUTO: 294 K/UL (ref 150–450)
PMV BLD AUTO: 12.4 FL (ref 9.2–12.9)
POTASSIUM SERPL-SCNC: 4.3 MMOL/L (ref 3.5–5.1)
PROT SERPL-MCNC: 8.3 GM/DL (ref 6–8.4)
RBC # BLD AUTO: 5.75 M/UL (ref 4–5.4)
RELATIVE EOSINOPHIL (OHS): 5.1 %
RELATIVE LYMPHOCYTE (OHS): 54.1 % (ref 18–48)
RELATIVE MONOCYTE (OHS): 12.6 % (ref 4–15)
RELATIVE NEUTROPHIL (OHS): 26.2 % (ref 38–73)
SODIUM SERPL-SCNC: 140 MMOL/L (ref 136–145)
T PALLIDUM IGG+IGM SER QL: NORMAL
TRIGL SERPL-MCNC: 81 MG/DL (ref 30–150)
TSH SERPL-ACNC: 1.17 UIU/ML (ref 0.4–4)
VIT B12 SERPL-MCNC: 515 PG/ML (ref 210–950)
WBC # BLD AUTO: 4.53 K/UL (ref 3.9–12.7)

## 2025-05-16 PROCEDURE — 36415 COLL VENOUS BLD VENIPUNCTURE: CPT | Mod: PO

## 2025-05-16 PROCEDURE — 80061 LIPID PANEL: CPT

## 2025-05-16 PROCEDURE — 84443 ASSAY THYROID STIM HORMONE: CPT

## 2025-05-16 PROCEDURE — 99999 PR PBB SHADOW E&M-EST. PATIENT-LVL V: CPT | Mod: PBBFAC,,, | Performed by: FAMILY MEDICINE

## 2025-05-16 PROCEDURE — 85025 COMPLETE CBC W/AUTO DIFF WBC: CPT

## 2025-05-16 PROCEDURE — 80053 COMPREHEN METABOLIC PANEL: CPT

## 2025-05-16 PROCEDURE — 82607 VITAMIN B-12: CPT

## 2025-05-16 PROCEDURE — 82306 VITAMIN D 25 HYDROXY: CPT

## 2025-05-16 PROCEDURE — 83036 HEMOGLOBIN GLYCOSYLATED A1C: CPT

## 2025-05-16 PROCEDURE — 87491 CHLMYD TRACH DNA AMP PROBE: CPT

## 2025-05-16 PROCEDURE — 87389 HIV-1 AG W/HIV-1&-2 AB AG IA: CPT

## 2025-05-16 PROCEDURE — 82746 ASSAY OF FOLIC ACID SERUM: CPT

## 2025-05-16 PROCEDURE — 86593 SYPHILIS TEST NON-TREP QUANT: CPT

## 2025-05-16 RX ORDER — MELOXICAM 15 MG/1
15 TABLET ORAL DAILY
Qty: 90 TABLET | Refills: 3 | Status: SHIPPED | OUTPATIENT
Start: 2025-05-16

## 2025-05-16 NOTE — PROGRESS NOTES
"Subjective:         Patient ID: So Winkler is a 49 y.o. female.    Chief Complaint: Annual Exam    Patient Active Problem List   Diagnosis    Class 3 drug-induced obesity with serious comorbidity and body mass index (BMI) of 40.0 to 44.9 in adult    Prediabetes    Family history of diabetes mellitus    Radial scar of breast    Elevated liver function tests    Low HDL (under 40)    Enlarged lymph node in neck    Vitamin D deficiency    Elevated blood pressure reading without diagnosis of hypertension      LA NENA Rizzo is a 49 y.o. female    History of Present Illness    CHIEF COMPLAINT:  So presents today for an annual exam and knee pain.    MUSCULOSKELETAL:  She reports bilateral knee pain that started 3 weeks ago, worse on the left side. The pain extends from the kneecap down and is constant, even when lying down. She has difficulty bending her leg and washing her feet due to the pain. She denies any preceding trauma or falls. She experiences bilateral foot numbness from the knees down but denies symptoms above the knee or in the lower back. X-rays showed no joint effusion or bone-on-bone changes. Ultrasound ruled out blood clot. She reports extensive walking on hard floors at work. She wears support stockings and has purchased Malik shoes for work use. Ibuprofen and naproxen have been ineffective for pain management.    LYMPHADENOPATHY:  She reports intermittent lymph node swelling with two nodes affected on the right side and one on the left side. The right lymph node causes the most discomfort when swollen. The lymph node issues appear to correlate with her allergy symptoms. Currently, the lymph nodes are not inflamed. Prior ultrasound done without concerning features.       Objective:     Vitals:    05/16/25 0858 05/16/25 0929   BP: (!) 124/90 118/80   BP Location: Left arm    Patient Position: Sitting    Pulse: (!) 58    SpO2: 98%    Weight: 117.9 kg (259 lb 14.8 oz)    Height: 5' 6" (1.676 " m)          Physical Exam  Vitals and nursing note reviewed.   Constitutional:       General: She is not in acute distress.     Appearance: Normal appearance. She is not ill-appearing, toxic-appearing or diaphoretic.   HENT:      Head: Normocephalic and atraumatic.   Eyes:      General: No scleral icterus.     Conjunctiva/sclera: Conjunctivae normal.   Cardiovascular:      Rate and Rhythm: Normal rate.      Heart sounds: Normal heart sounds. No murmur heard.  Pulmonary:      Effort: Pulmonary effort is normal. No respiratory distress.   Skin:     Coloration: Skin is not pale.   Neurological:      Mental Status: She is alert. Mental status is at baseline.   Psychiatric:         Attention and Perception: Attention and perception normal.         Mood and Affect: Mood and affect normal.         Speech: Speech normal.         Behavior: Behavior normal.         Cognition and Memory: Cognition and memory normal.         Judgment: Judgment normal.       Assessment:       1. Encounter for general adult medical examination with abnormal findings    2. Elevated blood pressure reading without diagnosis of hypertension    3. Neuropathy    4. Chronic pain of left knee    5. Encounter for screening mammogram for breast cancer    6. Colon cancer screening    7. Class 3 drug-induced obesity with serious comorbidity and body mass index (BMI) of 40.0 to 44.9 in adult    8. Family history of diabetes mellitus    9. Vitamin D deficiency    10. Prediabetes    11. Elevated liver function tests    12. Routine screening for STI (sexually transmitted infection)    13. Enlarged lymph node in neck          Plan:   Recent relevant labs results reviewed with patient.         Assessment & Plan    Assessed bilateral knee pain, with left knee more severe. Persistent despite conservative management for > 6 weeks.   Evaluated BP, noting slight elevation in diastolic reading, improved on recheck. Weight management encouraged.   Reviewed recent lab  results, including prediabetes results    1. Encounter for general adult medical examination with abnormal findings  -     Comprehensive Metabolic Panel; Future; Expected date: 05/16/2025  -     CBC Auto Differential; Future; Expected date: 05/16/2025  -     Lipid Panel; Future; Expected date: 05/16/2025  -     Hemoglobin A1C; Future; Expected date: 05/16/2025  -     TSH; Future; Expected date: 05/16/2025  -     Comprehensive Metabolic Panel; Future; Expected date: 05/16/2026  -     CBC Auto Differential; Future; Expected date: 05/16/2026  -     Lipid Panel; Future; Expected date: 05/16/2026  -     Hemoglobin A1C; Future; Expected date: 05/16/2026  -     TSH; Future; Expected date: 05/16/2026  - Risk and age appropriate anticipatory guidance.  reviewed and updated. Recommendations discussed with patient as appropriate.     2. Elevated blood pressure reading without diagnosis of hypertension  Recheck improved. Clinically monitor and manage contributing risk factors    3. Neuropathy  -     Vitamin B12; Future; Expected date: 05/16/2025  -     Folate; Future; Expected date: 05/16/2025    4. Chronic pain of left knee  -     MRI Knee Without Contrast Left; Future; Expected date: 05/16/2025  -     Ambulatory referral/consult to Orthopedics; Future; Expected date: 05/23/2025  -     meloxicam (MOBIC) 15 MG tablet; Take 1 tablet (15 mg total) by mouth once daily. With food  Dispense: 90 tablet; Refill: 3  New - work up further.     5. Encounter for screening mammogram for breast cancer  -     Mammo Digital Screening Bilat w/ Jaiden (XPD); Future; Expected date: 06/28/2025    6. Colon cancer screening  -     Cologuard Screening (Multitarget Stool DNA); Future; Expected date: 05/16/2025    7. Class 3 drug-induced obesity with serious comorbidity and body mass index (BMI) of 40.0 to 44.9 in adult  Lifestyle modifications.     8. Family history of diabetes mellitus  Weight mgmt    9. Vitamin D deficiency  -     Vitamin D;  Future; Expected date: 05/16/2025    10. Prediabetes  -     Hemoglobin A1C; Future; Expected date: 05/16/2025    11. Elevated liver function tests  -     Comprehensive Metabolic Panel; Future; Expected date: 05/16/2025  Recheck - work up if needed.     12. Routine screening for STI (sexually transmitted infection)  -     HIV 1/2 Ag/Ab (4th Gen); Future; Expected date: 05/16/2025  -     Treponema Pallidium Antibodies IgG, IgM; Future; Expected date: 05/16/2025  -     C. trachomatis/N. gonorrhoeae by AMP DNA; Future; Expected date: 05/16/2025  -     HIV 1/2 Ag/Ab (4th Gen); Future; Expected date: 05/16/2026  -     Treponema Pallidium Antibodies IgG, IgM; Future; Expected date: 05/16/2026  -     C. trachomatis/N. gonorrhoeae by AMP DNA; Future; Expected date: 05/16/2026    13. Enlarged lymph node in neck  Comments:  Likely reactive. Consider ENT referral if concerning.    Patient's questions answered. Plan reviewed with patient at the end of visit. Relevant precautions to chief complaint and reasons to seek further medical care or to contact the office sooner reviewed with patient.     Follow up in about 1 year (around 5/16/2026) for Annual Exam, (prelabs).        Part of this note was dictated using voice recognition software. Please excuse any typographical errors.     This note was generated with the assistance of ambient listening technology. Verbal consent was obtained by the patient and accompanying visitor(s) for the recording of patient appointment to facilitate this note. I attest to having reviewed and edited the generated note for accuracy, though some syntax or spelling errors may persist. Please contact the author of this note for any clarification.

## 2025-05-17 LAB
C TRACH DNA SPEC QL NAA+PROBE: NOT DETECTED
CTGC SOURCE (OHS) ORD-325: NORMAL
N GONORRHOEA DNA UR QL NAA+PROBE: NOT DETECTED

## 2025-05-20 ENCOUNTER — RESULTS FOLLOW-UP (OUTPATIENT)
Dept: FAMILY MEDICINE | Facility: CLINIC | Age: 50
End: 2025-05-20

## 2025-05-23 NOTE — PROGRESS NOTES
CHW - Case Closure    This Community Health Worker spoke to patient via telephone today.   Pt/Caregiver reported: Pt stated that she has a MRI that she needs but she cannot afford the copay. Pt stated that she applied for the [facility] [employer] [facility] and was denied for her income. CHW provided pt with [facility] contact information to inquire services. Pt denied needing any other assistance at this time  Pt/Caregiver denied any additional needs at this time and agrees with episode closure at this time.  Provided patient with Community Health Worker's contact information and encouraged him/her to contact this Community Health Worker if additional needs arise.

## 2025-06-06 ENCOUNTER — OFFICE VISIT (OUTPATIENT)
Dept: ORTHOPEDICS | Facility: CLINIC | Age: 50
End: 2025-06-06
Payer: COMMERCIAL

## 2025-06-06 VITALS — WEIGHT: 266.75 LBS | BODY MASS INDEX: 43.06 KG/M2

## 2025-06-06 DIAGNOSIS — M25.562 CHRONIC PAIN OF LEFT KNEE: ICD-10-CM

## 2025-06-06 DIAGNOSIS — G89.29 CHRONIC PAIN OF LEFT KNEE: ICD-10-CM

## 2025-06-06 PROCEDURE — 1159F MED LIST DOCD IN RCRD: CPT | Mod: CPTII,S$GLB,, | Performed by: ORTHOPAEDIC SURGERY

## 2025-06-06 PROCEDURE — 99204 OFFICE O/P NEW MOD 45 MIN: CPT | Mod: 25,S$GLB,, | Performed by: ORTHOPAEDIC SURGERY

## 2025-06-06 PROCEDURE — 99999 PR PBB SHADOW E&M-EST. PATIENT-LVL III: CPT | Mod: PBBFAC,,, | Performed by: ORTHOPAEDIC SURGERY

## 2025-06-06 PROCEDURE — 1160F RVW MEDS BY RX/DR IN RCRD: CPT | Mod: CPTII,S$GLB,, | Performed by: ORTHOPAEDIC SURGERY

## 2025-06-06 PROCEDURE — 3008F BODY MASS INDEX DOCD: CPT | Mod: CPTII,S$GLB,, | Performed by: ORTHOPAEDIC SURGERY

## 2025-06-06 PROCEDURE — 20610 DRAIN/INJ JOINT/BURSA W/O US: CPT | Mod: LT,S$GLB,, | Performed by: ORTHOPAEDIC SURGERY

## 2025-06-06 PROCEDURE — 3044F HG A1C LEVEL LT 7.0%: CPT | Mod: CPTII,S$GLB,, | Performed by: ORTHOPAEDIC SURGERY

## 2025-06-06 RX ADMIN — TRIAMCINOLONE ACETONIDE 40 MG: 40 INJECTION, SUSPENSION INTRA-ARTICULAR; INTRAMUSCULAR at 10:06

## 2025-06-06 RX ADMIN — LIDOCAINE HYDROCHLORIDE 4 ML: 10 INJECTION, SOLUTION INFILTRATION; PERINEURAL at 10:06

## 2025-06-08 RX ORDER — LIDOCAINE HYDROCHLORIDE 10 MG/ML
4 INJECTION, SOLUTION INFILTRATION; PERINEURAL
Status: DISCONTINUED | OUTPATIENT
Start: 2025-06-06 | End: 2025-06-09 | Stop reason: HOSPADM

## 2025-06-08 RX ORDER — TRIAMCINOLONE ACETONIDE 40 MG/ML
40 INJECTION, SUSPENSION INTRA-ARTICULAR; INTRAMUSCULAR
Status: DISCONTINUED | OUTPATIENT
Start: 2025-06-06 | End: 2025-06-09 | Stop reason: HOSPADM
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
## 2025-06-09 NOTE — PROCEDURES
Large Joint Aspiration/Injection: L knee    Date/Time: 6/6/2025 10:45 AM    Performed by: Maximino Ramos MD  Authorized by: Maximino Ramos MD    Consent Done?:  Yes (Verbal)  Indications:  Pain  Site marked: the procedure site was marked    Timeout: prior to procedure the correct patient, procedure, and site was verified    Prep: patient was prepped and draped in usual sterile fashion      Local anesthesia used?: Yes    Local anesthetic:  Topical anesthetic and lidocaine 1% without epinephrine  Anesthetic total (ml):  4      Details:  Needle Size:  22 G  Ultrasonic Guidance for needle placement?: No    Approach:  Lateral  Location:  Knee  Site:  L knee  Medications:  4 mL LIDOcaine HCL 10 mg/ml (1%) 10 mg/mL (1 %); 40 mg triamcinolone acetonide 40 mg/mL  Patient tolerance:  Patient tolerated the procedure well with no immediate complications     A mixture containing 4 cc of 1% lidocaine and 1 cc of Kenalog (40mg/cc) was injected.

## 2025-07-18 ENCOUNTER — HOSPITAL ENCOUNTER (OUTPATIENT)
Dept: RADIOLOGY | Facility: HOSPITAL | Age: 50
Discharge: HOME | End: 2025-07-18
Attending: FAMILY MEDICINE
Payer: COMMERCIAL

## 2025-07-18 DIAGNOSIS — Z12.31 ENCOUNTER FOR SCREENING MAMMOGRAM FOR BREAST CANCER: ICD-10-CM

## 2025-07-18 PROCEDURE — 77063 BREAST TOMOSYNTHESIS BI: CPT | Mod: TC

## 2025-07-18 PROCEDURE — 77067 SCR MAMMO BI INCL CAD: CPT | Mod: 26,,, | Performed by: RADIOLOGY

## 2025-07-18 PROCEDURE — 77063 BREAST TOMOSYNTHESIS BI: CPT | Mod: 26,,, | Performed by: RADIOLOGY

## 2025-08-15 ENCOUNTER — PATIENT OUTREACH (OUTPATIENT)
Dept: ADMINISTRATIVE | Facility: HOSPITAL | Age: 50
End: 2025-08-15
Payer: COMMERCIAL

## (undated) DEVICE — SUT VICRYL PLUS 4-0 PS2 27

## (undated) DEVICE — SEE MEDLINE ITEM 157131

## (undated) DEVICE — APPLICATOR CHLORAPREP ORN 26ML

## (undated) DEVICE — NDL HYPO REG 25G X 1 1/2

## (undated) DEVICE — CONTAINER SPECIMEN OR STER 4OZ

## (undated) DEVICE — SUT 2/0 30IN SILK BLK BRAI

## (undated) DEVICE — SYR 10CC LUER LOCK

## (undated) DEVICE — DRAPE STERI INSTRUMENT 1018

## (undated) DEVICE — ADHESIVE DERMABOND ADVANCED

## (undated) DEVICE — Device

## (undated) DEVICE — MARKER SKIN STND TIP BLUE BARR

## (undated) DEVICE — ELECTRODE BLADE INSULATED 1 IN

## (undated) DEVICE — SPONGE SUPER KERLIX 6X6.75IN

## (undated) DEVICE — GOWN SMART IMP BREATHABLE XXLG

## (undated) DEVICE — GLOVE BIOGEL SKINSENSE PI 6.5

## (undated) DEVICE — SUT VICRYL 3-0 27 SH

## (undated) DEVICE — UNDERGLOVES BIOGEL PI SZ 7 LF

## (undated) DEVICE — ELECTRODE REM PLYHSV RETURN 9

## (undated) DEVICE — TOWEL OR DISP STRL BLUE 4/PK

## (undated) DEVICE — SHEATH GUIDE SCOUT SURG RADAR